# Patient Record
Sex: FEMALE | Race: WHITE | NOT HISPANIC OR LATINO | URBAN - METROPOLITAN AREA
[De-identification: names, ages, dates, MRNs, and addresses within clinical notes are randomized per-mention and may not be internally consistent; named-entity substitution may affect disease eponyms.]

---

## 2017-06-28 ENCOUNTER — INPATIENT (INPATIENT)
Facility: HOSPITAL | Age: 27
LOS: 2 days | Discharge: HOME | End: 2017-07-01
Attending: OBSTETRICS & GYNECOLOGY | Admitting: OBSTETRICS & GYNECOLOGY
Payer: COMMERCIAL

## 2017-06-28 VITALS — WEIGHT: 160.94 LBS | HEIGHT: 61 IN

## 2017-06-28 LAB
BASOPHILS NFR BLD AUTO: 0.4 % — SIGNIFICANT CHANGE UP (ref 0–2)
BLD GP AB SCN SERPL QL: NEGATIVE — SIGNIFICANT CHANGE UP
EOSINOPHIL NFR BLD AUTO: 0.4 % — SIGNIFICANT CHANGE UP (ref 0–6)
HCT VFR BLD CALC: 30.4 % — LOW (ref 34.5–45)
HGB BLD-MCNC: 9.9 G/DL — LOW (ref 11.5–15.5)
LYMPHOCYTES # BLD AUTO: 19.4 % — SIGNIFICANT CHANGE UP (ref 13–44)
MCHC RBC-ENTMCNC: 25.3 PG — LOW (ref 27–34)
MCHC RBC-ENTMCNC: 32.6 G/DL — SIGNIFICANT CHANGE UP (ref 32–36)
MCV RBC AUTO: 77.6 FL — LOW (ref 80–100)
MONOCYTES NFR BLD AUTO: 6.9 % — SIGNIFICANT CHANGE UP (ref 2–14)
NEUTROPHILS NFR BLD AUTO: 72.9 % — SIGNIFICANT CHANGE UP (ref 43–77)
PLATELET # BLD AUTO: 179 K/UL — SIGNIFICANT CHANGE UP (ref 150–400)
RBC # BLD: 3.92 M/UL — SIGNIFICANT CHANGE UP (ref 3.8–5.2)
RBC # FLD: 13.1 % — SIGNIFICANT CHANGE UP (ref 10.3–16.9)
RH IG SCN BLD-IMP: POSITIVE — SIGNIFICANT CHANGE UP
WBC # BLD: 8.5 K/UL — SIGNIFICANT CHANGE UP (ref 3.8–10.5)
WBC # FLD AUTO: 8.5 K/UL — SIGNIFICANT CHANGE UP (ref 3.8–10.5)

## 2017-06-28 RX ORDER — METOCLOPRAMIDE HCL 10 MG
10 TABLET ORAL ONCE
Qty: 0 | Refills: 0 | Status: DISCONTINUED | OUTPATIENT
Start: 2017-06-28 | End: 2017-06-28

## 2017-06-28 RX ORDER — DIPHENHYDRAMINE HCL 50 MG
25 CAPSULE ORAL EVERY 6 HOURS
Qty: 0 | Refills: 0 | Status: DISCONTINUED | OUTPATIENT
Start: 2017-06-29 | End: 2017-07-01

## 2017-06-28 RX ORDER — LANOLIN
1 OINTMENT (GRAM) TOPICAL
Qty: 0 | Refills: 0 | Status: DISCONTINUED | OUTPATIENT
Start: 2017-06-29 | End: 2017-07-01

## 2017-06-28 RX ORDER — CEFAZOLIN SODIUM 1 G
2000 VIAL (EA) INJECTION ONCE
Qty: 0 | Refills: 0 | Status: DISCONTINUED | OUTPATIENT
Start: 2017-06-28 | End: 2017-07-01

## 2017-06-28 RX ORDER — HEPARIN SODIUM 5000 [USP'U]/ML
5000 INJECTION INTRAVENOUS; SUBCUTANEOUS EVERY 12 HOURS
Qty: 0 | Refills: 0 | Status: DISCONTINUED | OUTPATIENT
Start: 2017-06-29 | End: 2017-07-01

## 2017-06-28 RX ORDER — SODIUM CHLORIDE 9 MG/ML
1000 INJECTION, SOLUTION INTRAVENOUS
Qty: 0 | Refills: 0 | Status: DISCONTINUED | OUTPATIENT
Start: 2017-06-28 | End: 2017-06-29

## 2017-06-28 RX ORDER — DOCUSATE SODIUM 100 MG
100 CAPSULE ORAL
Qty: 0 | Refills: 0 | Status: DISCONTINUED | OUTPATIENT
Start: 2017-06-29 | End: 2017-07-01

## 2017-06-28 RX ORDER — IBUPROFEN 200 MG
600 TABLET ORAL EVERY 6 HOURS
Qty: 0 | Refills: 0 | Status: DISCONTINUED | OUTPATIENT
Start: 2017-06-29 | End: 2017-07-01

## 2017-06-28 RX ORDER — TETANUS TOXOID, REDUCED DIPHTHERIA TOXOID AND ACELLULAR PERTUSSIS VACCINE, ADSORBED 5; 2.5; 8; 8; 2.5 [IU]/.5ML; [IU]/.5ML; UG/.5ML; UG/.5ML; UG/.5ML
0.5 SUSPENSION INTRAMUSCULAR ONCE
Qty: 0 | Refills: 0 | Status: DISCONTINUED | OUTPATIENT
Start: 2017-06-29 | End: 2017-07-01

## 2017-06-28 RX ORDER — OXYTOCIN 10 UNIT/ML
41.67 VIAL (ML) INJECTION
Qty: 20 | Refills: 0 | Status: DISCONTINUED | OUTPATIENT
Start: 2017-06-28 | End: 2017-07-01

## 2017-06-28 RX ORDER — CITRIC ACID/SODIUM CITRATE 300-500 MG
30 SOLUTION, ORAL ORAL ONCE
Qty: 0 | Refills: 0 | Status: DISCONTINUED | OUTPATIENT
Start: 2017-06-28 | End: 2017-06-28

## 2017-06-28 RX ORDER — METOCLOPRAMIDE HCL 10 MG
10 TABLET ORAL ONCE
Qty: 0 | Refills: 0 | Status: COMPLETED | OUTPATIENT
Start: 2017-06-28 | End: 2017-06-28

## 2017-06-28 RX ORDER — SODIUM CHLORIDE 9 MG/ML
1000 INJECTION, SOLUTION INTRAVENOUS
Qty: 0 | Refills: 0 | Status: DISCONTINUED | OUTPATIENT
Start: 2017-06-29 | End: 2017-07-01

## 2017-06-28 RX ORDER — NALOXONE HYDROCHLORIDE 4 MG/.1ML
0.1 SPRAY NASAL
Qty: 0 | Refills: 0 | Status: DISCONTINUED | OUTPATIENT
Start: 2017-06-28 | End: 2017-07-01

## 2017-06-28 RX ORDER — SIMETHICONE 80 MG/1
80 TABLET, CHEWABLE ORAL EVERY 4 HOURS
Qty: 0 | Refills: 0 | Status: DISCONTINUED | OUTPATIENT
Start: 2017-06-29 | End: 2017-07-01

## 2017-06-28 RX ORDER — ONDANSETRON 8 MG/1
4 TABLET, FILM COATED ORAL EVERY 6 HOURS
Qty: 0 | Refills: 0 | Status: DISCONTINUED | OUTPATIENT
Start: 2017-06-28 | End: 2017-07-01

## 2017-06-28 RX ORDER — OXYTOCIN 10 UNIT/ML
333.33 VIAL (ML) INJECTION
Qty: 20 | Refills: 0 | Status: DISCONTINUED | OUTPATIENT
Start: 2017-06-28 | End: 2017-07-01

## 2017-06-28 RX ORDER — GLYCERIN ADULT
1 SUPPOSITORY, RECTAL RECTAL AT BEDTIME
Qty: 0 | Refills: 0 | Status: DISCONTINUED | OUTPATIENT
Start: 2017-06-29 | End: 2017-07-01

## 2017-06-28 RX ORDER — SODIUM CHLORIDE 9 MG/ML
1000 INJECTION, SOLUTION INTRAVENOUS ONCE
Qty: 0 | Refills: 0 | Status: COMPLETED | OUTPATIENT
Start: 2017-06-28 | End: 2017-06-28

## 2017-06-28 RX ORDER — MORPHINE SULFATE 50 MG/1
0.2 CAPSULE, EXTENDED RELEASE ORAL ONCE
Qty: 0 | Refills: 0 | Status: DISCONTINUED | OUTPATIENT
Start: 2017-06-28 | End: 2017-07-01

## 2017-06-28 RX ORDER — ACETAMINOPHEN 500 MG
650 TABLET ORAL EVERY 6 HOURS
Qty: 0 | Refills: 0 | Status: DISCONTINUED | OUTPATIENT
Start: 2017-06-29 | End: 2017-07-01

## 2017-06-28 RX ORDER — FERROUS SULFATE 325(65) MG
325 TABLET ORAL DAILY
Qty: 0 | Refills: 0 | Status: DISCONTINUED | OUTPATIENT
Start: 2017-06-29 | End: 2017-07-01

## 2017-06-28 RX ORDER — OXYTOCIN 10 UNIT/ML
41.67 VIAL (ML) INJECTION
Qty: 20 | Refills: 0 | Status: DISCONTINUED | OUTPATIENT
Start: 2017-06-29 | End: 2017-07-01

## 2017-06-28 RX ORDER — SODIUM CHLORIDE 9 MG/ML
1000 INJECTION, SOLUTION INTRAVENOUS
Qty: 0 | Refills: 0 | Status: DISCONTINUED | OUTPATIENT
Start: 2017-06-28 | End: 2017-06-28

## 2017-06-28 RX ADMIN — SODIUM CHLORIDE 125 MILLILITER(S): 9 INJECTION, SOLUTION INTRAVENOUS at 20:11

## 2017-06-28 RX ADMIN — ONDANSETRON 4 MILLIGRAM(S): 8 TABLET, FILM COATED ORAL at 19:20

## 2017-06-28 RX ADMIN — Medication 10 MILLIGRAM(S): at 21:24

## 2017-06-28 RX ADMIN — SODIUM CHLORIDE 125 MILLILITER(S): 9 INJECTION, SOLUTION INTRAVENOUS at 16:06

## 2017-06-28 RX ADMIN — SODIUM CHLORIDE 2000 MILLILITER(S): 9 INJECTION, SOLUTION INTRAVENOUS at 16:06

## 2017-06-28 NOTE — PATIENT PROFILE OB - PRENATAL LABORATORY TESTS, INFANT PROFILE
HBsAG/HIV/VDRL/RPR/blood type/rubella VDRL/RPR/HBsAG/rubella/blood type/group B strep/HIV antibody screen/rubella/HBsAG/VDRL/RPR/group B strep/blood type/HIV

## 2017-06-29 LAB
HCT VFR BLD CALC: 27 % — LOW (ref 34.5–45)
HGB BLD-MCNC: 8.8 G/DL — LOW (ref 11.5–15.5)
MCHC RBC-ENTMCNC: 25.1 PG — LOW (ref 27–34)
MCHC RBC-ENTMCNC: 32.6 G/DL — SIGNIFICANT CHANGE UP (ref 32–36)
MCV RBC AUTO: 77.1 FL — LOW (ref 80–100)
PLATELET # BLD AUTO: 158 K/UL — SIGNIFICANT CHANGE UP (ref 150–400)
RBC # BLD: 3.5 M/UL — LOW (ref 3.8–5.2)
RBC # FLD: 13.2 % — SIGNIFICANT CHANGE UP (ref 10.3–16.9)
T PALLIDUM AB TITR SER: NEGATIVE — SIGNIFICANT CHANGE UP
WBC # BLD: 7.3 K/UL — SIGNIFICANT CHANGE UP (ref 3.8–10.5)
WBC # FLD AUTO: 7.3 K/UL — SIGNIFICANT CHANGE UP (ref 3.8–10.5)

## 2017-06-29 RX ADMIN — Medication 600 MILLIGRAM(S): at 06:04

## 2017-06-29 RX ADMIN — Medication 600 MILLIGRAM(S): at 05:10

## 2017-06-29 RX ADMIN — HEPARIN SODIUM 5000 UNIT(S): 5000 INJECTION INTRAVENOUS; SUBCUTANEOUS at 06:20

## 2017-06-29 RX ADMIN — Medication 100 MILLIGRAM(S): at 05:07

## 2017-06-29 RX ADMIN — Medication 600 MILLIGRAM(S): at 14:49

## 2017-06-29 RX ADMIN — Medication 600 MILLIGRAM(S): at 14:05

## 2017-06-29 RX ADMIN — Medication 325 MILLIGRAM(S): at 11:26

## 2017-06-29 RX ADMIN — HEPARIN SODIUM 5000 UNIT(S): 5000 INJECTION INTRAVENOUS; SUBCUTANEOUS at 18:14

## 2017-06-29 RX ADMIN — Medication 600 MILLIGRAM(S): at 21:42

## 2017-06-29 RX ADMIN — Medication 600 MILLIGRAM(S): at 22:15

## 2017-06-29 RX ADMIN — Medication 100 MILLIGRAM(S): at 11:26

## 2017-06-30 RX ADMIN — Medication 600 MILLIGRAM(S): at 12:44

## 2017-06-30 RX ADMIN — Medication 600 MILLIGRAM(S): at 06:48

## 2017-06-30 RX ADMIN — Medication 600 MILLIGRAM(S): at 18:28

## 2017-06-30 RX ADMIN — Medication 600 MILLIGRAM(S): at 07:20

## 2017-06-30 RX ADMIN — Medication 600 MILLIGRAM(S): at 13:11

## 2017-06-30 RX ADMIN — Medication 1 TABLET(S): at 09:57

## 2017-06-30 RX ADMIN — HEPARIN SODIUM 5000 UNIT(S): 5000 INJECTION INTRAVENOUS; SUBCUTANEOUS at 18:28

## 2017-06-30 RX ADMIN — HEPARIN SODIUM 5000 UNIT(S): 5000 INJECTION INTRAVENOUS; SUBCUTANEOUS at 06:48

## 2017-06-30 RX ADMIN — SIMETHICONE 80 MILLIGRAM(S): 80 TABLET, CHEWABLE ORAL at 06:57

## 2017-06-30 RX ADMIN — Medication 325 MILLIGRAM(S): at 09:57

## 2017-06-30 NOTE — DISCHARGE NOTE OB - CARE PROVIDER_API CALL
Hansel Mae (MD), Obstetrics and Gynecology  [address] [place] [address], [place] [id number]  Phone: [phone]  Fax: [phone]

## 2017-06-30 NOTE — DISCHARGE NOTE OB - CARE PLAN
Principal Discharge DX:	Postpartum state  Goal:	Postpartum  Instructions for follow-up, activity and diet:	Follow up in 1-2 weeks.  No heavy lifting, pelvic rest until cleared.

## 2017-06-30 NOTE — DISCHARGE NOTE OB - PATIENT PORTAL LINK FT
“You can access the FollowHealth Patient Portal, offered by [facility], by registering with the following website: [email or web address]”

## 2017-07-01 VITALS
TEMPERATURE: 98 F | SYSTOLIC BLOOD PRESSURE: 107 MMHG | RESPIRATION RATE: 15 BRPM | OXYGEN SATURATION: 100 % | DIASTOLIC BLOOD PRESSURE: 69 MMHG | HEART RATE: 84 BPM

## 2017-07-01 PROCEDURE — 36415 COLL VENOUS BLD VENIPUNCTURE: CPT

## 2017-07-01 PROCEDURE — 86901 BLOOD TYPING SEROLOGIC RH(D): CPT

## 2017-07-01 PROCEDURE — C1765: CPT

## 2017-07-01 PROCEDURE — 85027 COMPLETE CBC AUTOMATED: CPT

## 2017-07-01 PROCEDURE — 85025 COMPLETE CBC W/AUTO DIFF WBC: CPT

## 2017-07-01 PROCEDURE — 86850 RBC ANTIBODY SCREEN: CPT

## 2017-07-01 PROCEDURE — 86780 TREPONEMA PALLIDUM: CPT

## 2017-07-01 PROCEDURE — 86900 BLOOD TYPING SEROLOGIC ABO: CPT

## 2017-07-01 RX ADMIN — Medication 600 MILLIGRAM(S): at 07:15

## 2017-07-01 RX ADMIN — Medication 600 MILLIGRAM(S): at 12:27

## 2017-07-01 RX ADMIN — HEPARIN SODIUM 5000 UNIT(S): 5000 INJECTION INTRAVENOUS; SUBCUTANEOUS at 06:39

## 2017-07-01 RX ADMIN — Medication 600 MILLIGRAM(S): at 06:37

## 2017-07-01 RX ADMIN — Medication 325 MILLIGRAM(S): at 11:48

## 2017-07-01 RX ADMIN — Medication 100 MILLIGRAM(S): at 11:48

## 2017-07-01 RX ADMIN — Medication 1 TABLET(S): at 11:48

## 2017-07-01 RX ADMIN — Medication 600 MILLIGRAM(S): at 12:59

## 2017-07-01 NOTE — PROGRESS NOTE ADULT - SUBJECTIVE AND OBJECTIVE BOX
Patient evaluated at bedside.   She reports pain is well controlled.  She denies heavy vaginal bleeding.  She has been ambulating without assistance, voiding spontaneously, passing gas, tolerating regular diet.    Physical Exam:  Vital Signs Last 24 Hrs  T(C): 36.4 (01 Jul 2017 10:07), Max: 36.7 (30 Jun 2017 18:39)  T(F): 97.6 (01 Jul 2017 10:07), Max: 98 (30 Jun 2017 18:39)  HR: 84 (01 Jul 2017 10:07) (81 - 93)  BP: 107/69 (01 Jul 2017 10:07) (100/69 - 110/69)  BP(mean): --  RR: 15 (01 Jul 2017 10:07) (15 - 17)  SpO2: 100% (01 Jul 2017 10:07) (99% - 100%)    GA: NAD, A+0 x 3  Abd: soft, nontender, nondistended, no rebound or guarding, incision clean, dry and intact, uterus firm at midline,  below the umbilicus  : lochia WNL  Extremities: no calf tenderness
Patient evaluated at bedside.   She reports pain is well controlled.  She denies heavy vaginal bleeding.  She has not yet ambulating,not yet passing gas, toleratingclears and is breastfeeding.    Physical Exam:  Vital Signs Last 24 Hrs  T(C): 36.6 (29 Jun 2017 05:05), Max: 36.6 (29 Jun 2017 05:05)  T(F): 97.8 (29 Jun 2017 05:05), Max: 97.8 (29 Jun 2017 05:05)  HR: 87 (29 Jun 2017 05:05) (81 - 108)  BP: 106/70 (29 Jun 2017 05:05) (87/62 - 130/80)  BP(mean): --  RR: 18 (29 Jun 2017 05:05) (16 - 18)  SpO2: 98% (29 Jun 2017 05:05) (98% - 100%)    GA: NAD, A+0 x 3  Abd: soft, nontender, nondistended, no rebound or guarding, incision clean, dry and intact, uterus firm at midline,  below the umbilicus  : lochia WNL  Extremities: no calf tenderness                            8.8    7.3   )-----------( 158      ( 29 Jun 2017 05:17 )             27.0
Patient evaluated at bedside.   She reports pain is well controlled.  She denies heavy vaginal bleeding.  She has been ambulating without assistance, voiding spontaneously, passing gas, tolerating regular diet.    Physical Exam:  Vital Signs Last 24 Hrs  T(C): 36.8 (30 Jun 2017 06:00), Max: 36.8 (30 Jun 2017 06:00)  T(F): 98.2 (30 Jun 2017 06:00), Max: 98.2 (30 Jun 2017 06:00)  HR: 96 (30 Jun 2017 06:00) (91 - 96)  BP: 98/62 (30 Jun 2017 06:00) (98/62 - 99/67)  BP(mean): --  RR: 18 (30 Jun 2017 06:00) (18 - 18)  SpO2: 98% (30 Jun 2017 06:00) (97% - 98%)    GA: NAD, A+0 x 3  Abd: soft, nontender, nondistended, no rebound or guarding, incision clean, dry and intact, uterus firm at midline,  below the umbilicus  : lochia WNL  Extremities: no calf tenderness                            8.8    7.3   )-----------( 158      ( 29 Jun 2017 05:17 )             27.0

## 2017-07-01 NOTE — PROGRESS NOTE ADULT - ASSESSMENT
A/P  28yo  s/p c/s, POD #2  ,stable  1. Pain: well controlled on PO pain meds  2. GI: regular diet  3. : voiding  4. DVT prophylaxis: sqh, ambulation  5. Dispo: POD 3 or 4
A/P  26yo  s/p c/s, POD #1  ,stable  1. Pain: well controlled on PO pain meds  2. GI: advance diet as tolerated  3. : masters out this AM for TOV  4. DVT prophylaxis: SQH, ambualtion  5. Dispo: POD 3 or 4
A/P  28yo  s/p c/s, POD #1  ,stable  1. Pain: well controlled on PO pain meds  2. GI: regular diet  3. : voiding  4. DVT prophylaxis: SQH, ambulation  5. Dispo: POD 3

## 2017-07-05 DIAGNOSIS — Z3A.39 39 WEEKS GESTATION OF PREGNANCY: ICD-10-CM

## 2017-07-05 DIAGNOSIS — O34.211 MATERNAL CARE FOR LOW TRANSVERSE SCAR FROM PREVIOUS CESAREAN DELIVERY: ICD-10-CM

## 2020-08-07 PROBLEM — Z00.00 ENCOUNTER FOR PREVENTIVE HEALTH EXAMINATION: Status: ACTIVE | Noted: 2020-08-07

## 2020-08-31 ENCOUNTER — APPOINTMENT (OUTPATIENT)
Dept: ANTEPARTUM | Facility: CLINIC | Age: 30
End: 2020-08-31
Payer: MEDICAID

## 2020-08-31 ENCOUNTER — LABORATORY RESULT (OUTPATIENT)
Age: 30
End: 2020-08-31

## 2020-08-31 PROCEDURE — 76813 OB US NUCHAL MEAS 1 GEST: CPT

## 2020-10-26 ENCOUNTER — APPOINTMENT (OUTPATIENT)
Dept: ANTEPARTUM | Facility: CLINIC | Age: 30
End: 2020-10-26
Payer: MEDICAID

## 2020-10-26 PROCEDURE — 76817 TRANSVAGINAL US OBSTETRIC: CPT

## 2020-10-26 PROCEDURE — 99072 ADDL SUPL MATRL&STAF TM PHE: CPT

## 2020-10-26 PROCEDURE — 76805 OB US >/= 14 WKS SNGL FETUS: CPT

## 2020-12-01 ENCOUNTER — APPOINTMENT (OUTPATIENT)
Dept: ANTEPARTUM | Facility: CLINIC | Age: 30
End: 2020-12-01
Payer: MEDICAID

## 2020-12-01 PROCEDURE — 76817 TRANSVAGINAL US OBSTETRIC: CPT

## 2020-12-01 PROCEDURE — 76816 OB US FOLLOW-UP PER FETUS: CPT

## 2020-12-01 PROCEDURE — 76819 FETAL BIOPHYS PROFIL W/O NST: CPT

## 2020-12-01 PROCEDURE — 99072 ADDL SUPL MATRL&STAF TM PHE: CPT

## 2020-12-22 ENCOUNTER — APPOINTMENT (OUTPATIENT)
Dept: ANTEPARTUM | Facility: CLINIC | Age: 30
End: 2020-12-22

## 2021-02-25 ENCOUNTER — EMERGENCY (EMERGENCY)
Facility: HOSPITAL | Age: 31
LOS: 1 days | Discharge: LEFT BEFORE DISCHARGE | End: 2021-02-25
Attending: EMERGENCY MEDICINE | Admitting: EMERGENCY MEDICINE
Payer: MEDICAID

## 2021-02-25 VITALS
OXYGEN SATURATION: 100 % | RESPIRATION RATE: 18 BRPM | DIASTOLIC BLOOD PRESSURE: 78 MMHG | SYSTOLIC BLOOD PRESSURE: 115 MMHG | HEART RATE: 98 BPM | TEMPERATURE: 97 F

## 2021-02-25 VITALS
SYSTOLIC BLOOD PRESSURE: 120 MMHG | RESPIRATION RATE: 18 BRPM | OXYGEN SATURATION: 100 % | WEIGHT: 154.98 LBS | TEMPERATURE: 98 F | HEIGHT: 61 IN | HEART RATE: 106 BPM | DIASTOLIC BLOOD PRESSURE: 71 MMHG

## 2021-02-25 DIAGNOSIS — R06.02 SHORTNESS OF BREATH: ICD-10-CM

## 2021-02-25 DIAGNOSIS — O26.893 OTHER SPECIFIED PREGNANCY RELATED CONDITIONS, THIRD TRIMESTER: ICD-10-CM

## 2021-02-25 DIAGNOSIS — Z3A.38 38 WEEKS GESTATION OF PREGNANCY: ICD-10-CM

## 2021-02-25 DIAGNOSIS — R00.2 PALPITATIONS: ICD-10-CM

## 2021-02-25 LAB
ALBUMIN SERPL ELPH-MCNC: 3.4 G/DL — SIGNIFICANT CHANGE UP (ref 3.3–5)
ALP SERPL-CCNC: 139 U/L — HIGH (ref 40–120)
ALT FLD-CCNC: 9 U/L — LOW (ref 10–45)
ANION GAP SERPL CALC-SCNC: 13 MMOL/L — SIGNIFICANT CHANGE UP (ref 5–17)
AST SERPL-CCNC: 17 U/L — SIGNIFICANT CHANGE UP (ref 10–40)
BASOPHILS # BLD AUTO: 0.02 K/UL — SIGNIFICANT CHANGE UP (ref 0–0.2)
BASOPHILS NFR BLD AUTO: 0.3 % — SIGNIFICANT CHANGE UP (ref 0–2)
BILIRUB SERPL-MCNC: 0.2 MG/DL — SIGNIFICANT CHANGE UP (ref 0.2–1.2)
BUN SERPL-MCNC: 5 MG/DL — LOW (ref 7–23)
CALCIUM SERPL-MCNC: 9.4 MG/DL — SIGNIFICANT CHANGE UP (ref 8.4–10.5)
CHLORIDE SERPL-SCNC: 105 MMOL/L — SIGNIFICANT CHANGE UP (ref 96–108)
CO2 SERPL-SCNC: 20 MMOL/L — LOW (ref 22–31)
CREAT SERPL-MCNC: 0.51 MG/DL — SIGNIFICANT CHANGE UP (ref 0.5–1.3)
EOSINOPHIL # BLD AUTO: 0.03 K/UL — SIGNIFICANT CHANGE UP (ref 0–0.5)
EOSINOPHIL NFR BLD AUTO: 0.4 % — SIGNIFICANT CHANGE UP (ref 0–6)
GLUCOSE SERPL-MCNC: 72 MG/DL — SIGNIFICANT CHANGE UP (ref 70–99)
HCT VFR BLD CALC: 32.5 % — LOW (ref 34.5–45)
HGB BLD-MCNC: 10.2 G/DL — LOW (ref 11.5–15.5)
IMM GRANULOCYTES NFR BLD AUTO: 0.6 % — SIGNIFICANT CHANGE UP (ref 0–1.5)
LYMPHOCYTES # BLD AUTO: 1.46 K/UL — SIGNIFICANT CHANGE UP (ref 1–3.3)
LYMPHOCYTES # BLD AUTO: 18.8 % — SIGNIFICANT CHANGE UP (ref 13–44)
MCHC RBC-ENTMCNC: 25.1 PG — LOW (ref 27–34)
MCHC RBC-ENTMCNC: 31.4 GM/DL — LOW (ref 32–36)
MCV RBC AUTO: 79.9 FL — LOW (ref 80–100)
MONOCYTES # BLD AUTO: 0.63 K/UL — SIGNIFICANT CHANGE UP (ref 0–0.9)
MONOCYTES NFR BLD AUTO: 8.1 % — SIGNIFICANT CHANGE UP (ref 2–14)
NEUTROPHILS # BLD AUTO: 5.57 K/UL — SIGNIFICANT CHANGE UP (ref 1.8–7.4)
NEUTROPHILS NFR BLD AUTO: 71.8 % — SIGNIFICANT CHANGE UP (ref 43–77)
NRBC # BLD: 0 /100 WBCS — SIGNIFICANT CHANGE UP (ref 0–0)
PLATELET # BLD AUTO: 212 K/UL — SIGNIFICANT CHANGE UP (ref 150–400)
POTASSIUM SERPL-MCNC: 4.1 MMOL/L — SIGNIFICANT CHANGE UP (ref 3.5–5.3)
POTASSIUM SERPL-SCNC: 4.1 MMOL/L — SIGNIFICANT CHANGE UP (ref 3.5–5.3)
PROT SERPL-MCNC: 6.8 G/DL — SIGNIFICANT CHANGE UP (ref 6–8.3)
RBC # BLD: 4.07 M/UL — SIGNIFICANT CHANGE UP (ref 3.8–5.2)
RBC # FLD: 13.7 % — SIGNIFICANT CHANGE UP (ref 10.3–14.5)
SODIUM SERPL-SCNC: 138 MMOL/L — SIGNIFICANT CHANGE UP (ref 135–145)
WBC # BLD: 7.76 K/UL — SIGNIFICANT CHANGE UP (ref 3.8–10.5)
WBC # FLD AUTO: 7.76 K/UL — SIGNIFICANT CHANGE UP (ref 3.8–10.5)

## 2021-02-25 PROCEDURE — 84484 ASSAY OF TROPONIN QUANT: CPT

## 2021-02-25 PROCEDURE — 93005 ELECTROCARDIOGRAM TRACING: CPT

## 2021-02-25 PROCEDURE — 80053 COMPREHEN METABOLIC PANEL: CPT

## 2021-02-25 PROCEDURE — 82550 ASSAY OF CK (CPK): CPT

## 2021-02-25 PROCEDURE — 99285 EMERGENCY DEPT VISIT HI MDM: CPT

## 2021-02-25 PROCEDURE — 82553 CREATINE MB FRACTION: CPT

## 2021-02-25 PROCEDURE — 93010 ELECTROCARDIOGRAM REPORT: CPT

## 2021-02-25 PROCEDURE — 36415 COLL VENOUS BLD VENIPUNCTURE: CPT

## 2021-02-25 PROCEDURE — 93970 EXTREMITY STUDY: CPT | Mod: 26

## 2021-02-25 PROCEDURE — 71045 X-RAY EXAM CHEST 1 VIEW: CPT

## 2021-02-25 PROCEDURE — 93970 EXTREMITY STUDY: CPT

## 2021-02-25 PROCEDURE — 83880 ASSAY OF NATRIURETIC PEPTIDE: CPT

## 2021-02-25 PROCEDURE — 85025 COMPLETE CBC W/AUTO DIFF WBC: CPT

## 2021-02-25 PROCEDURE — 71045 X-RAY EXAM CHEST 1 VIEW: CPT | Mod: 26

## 2021-02-25 NOTE — ED PROVIDER NOTE - CLINICAL SUMMARY MEDICAL DECISION MAKING FREE TEXT BOX
31 y/o F pt presents to ED with shortness of breath and palpitations. Will check labs, EKG, CXR, LE dopplers, case discussed with OB on call, will be in ED to evaluate pt.

## 2021-02-25 NOTE — ED ADULT NURSE REASSESSMENT NOTE - NS ED NURSE REASSESS COMMENT FT1
Dr. Tay spoke to patient, patient signed AMA, states does not want to stay.  Patient left in stable condition, vital signs stable.

## 2021-02-25 NOTE — ED ADULT NURSE NOTE - CHIEF COMPLAINT QUOTE
31 y/o female in her 38th week of pregnancy came in c/o cough/SOB/palpitations for "months" worse today. Pt denies fever, chills.

## 2021-02-25 NOTE — ED ADULT NURSE REASSESSMENT NOTE - NS ED NURSE REASSESS COMMENT FT1
Patient a/oX 3, non-anxious, states no SOB at this time, no pain or any other symptoms.  Vital signs stable.  US pending.  STable and comfortable.  All labs sent.

## 2021-02-25 NOTE — ED ADULT NURSE REASSESSMENT NOTE - NS ED NURSE REASSESS COMMENT FT1
Pateint back from US, denies SOB or any other symptom complaint, vital signs stable.  Patient for L & D admit;  refuses admission, patient and  states wants to leave, does not want to stay.  OSWALD Ny aware and spoke w/ patient and ;  L & D called to speak with patient.  Will continue to monitor.

## 2021-02-25 NOTE — ED ADULT TRIAGE NOTE - CHIEF COMPLAINT QUOTE
29 y/o female in her 38th week of pregnancy came in c/o cough/SOB/palpitations for "months" worse today. Pt denies fever, chills.

## 2021-02-25 NOTE — ED ADULT NURSE NOTE - NSIMPLEMENTINTERV_GEN_ALL_ED
Implemented All Universal Safety Interventions:  [place] to call system. Call bell, personal items and telephone within reach. Instruct patient to call for assistance. Room bathroom lighting operational. Non-slip footwear when patient is off stretcher. Physically safe environment: no spills, clutter or unnecessary equipment. Stretcher in lowest position, wheels locked, appropriate side rails in place.

## 2021-02-25 NOTE — ED PROVIDER NOTE - OBJECTIVE STATEMENT
31 y/o F pt who is 38weeks pregnant with no pertinent PMHx and PSHx presents to ED c/o persistent sob and palpitations with associated chronic cough for months, however it has worsened today. Pt denies abdominal cramping, vaginal discharge, back pain, or any other acute complaints. Pt is well appearing in ED and speaking in full sentences.

## 2021-02-25 NOTE — ED ADULT NURSE NOTE - OBJECTIVE STATEMENT
29 y/o female received into the ED, axox3, stating that she is feeling SOB x2 days, palpitations and a "worsening cough."   Pt. is 30 weeks pregnant with 3rd child.  Pt. denies having any pregnancy complications to date.  Pt. denies having any chest pain at this time.

## 2021-03-05 PROBLEM — Z78.9 OTHER SPECIFIED HEALTH STATUS: Chronic | Status: ACTIVE | Noted: 2021-02-25

## 2021-03-08 ENCOUNTER — TRANSCRIPTION ENCOUNTER (OUTPATIENT)
Age: 31
End: 2021-03-08

## 2021-03-09 ENCOUNTER — RESULT REVIEW (OUTPATIENT)
Age: 31
End: 2021-03-09

## 2021-03-09 ENCOUNTER — INPATIENT (INPATIENT)
Facility: HOSPITAL | Age: 31
LOS: 1 days | Discharge: HOME | End: 2021-03-11
Attending: SPECIALIST | Admitting: SPECIALIST
Payer: MEDICAID

## 2021-03-09 VITALS — WEIGHT: 154.32 LBS | HEIGHT: 61 IN

## 2021-03-09 LAB
BLD GP AB SCN SERPL QL: NEGATIVE — SIGNIFICANT CHANGE UP
BLD GP AB SCN SERPL QL: NEGATIVE — SIGNIFICANT CHANGE UP
RH IG SCN BLD-IMP: POSITIVE — SIGNIFICANT CHANGE UP
RH IG SCN BLD-IMP: POSITIVE — SIGNIFICANT CHANGE UP

## 2021-03-09 PROCEDURE — 88307 TISSUE EXAM BY PATHOLOGIST: CPT | Mod: 26

## 2021-03-09 RX ORDER — DIPHENHYDRAMINE HCL 50 MG
25 CAPSULE ORAL EVERY 6 HOURS
Refills: 0 | Status: DISCONTINUED | OUTPATIENT
Start: 2021-03-09 | End: 2021-03-11

## 2021-03-09 RX ORDER — CHLORHEXIDINE GLUCONATE 213 G/1000ML
1 SOLUTION TOPICAL DAILY
Refills: 0 | Status: DISCONTINUED | OUTPATIENT
Start: 2021-03-09 | End: 2021-03-11

## 2021-03-09 RX ORDER — FOLIC ACID 0.8 MG
1 TABLET ORAL DAILY
Refills: 0 | Status: DISCONTINUED | OUTPATIENT
Start: 2021-03-09 | End: 2021-03-11

## 2021-03-09 RX ORDER — KETOROLAC TROMETHAMINE 30 MG/ML
30 SYRINGE (ML) INJECTION ONCE
Refills: 0 | Status: DISCONTINUED | OUTPATIENT
Start: 2021-03-09 | End: 2021-03-11

## 2021-03-09 RX ORDER — CITRIC ACID/SODIUM CITRATE 300-500 MG
15 SOLUTION, ORAL ORAL EVERY 6 HOURS
Refills: 0 | Status: DISCONTINUED | OUTPATIENT
Start: 2021-03-09 | End: 2021-03-11

## 2021-03-09 RX ORDER — OXYTOCIN 10 UNIT/ML
333.33 VIAL (ML) INJECTION
Qty: 20 | Refills: 0 | Status: DISCONTINUED | OUTPATIENT
Start: 2021-03-09 | End: 2021-03-09

## 2021-03-09 RX ORDER — IBUPROFEN 200 MG
600 TABLET ORAL EVERY 6 HOURS
Refills: 0 | Status: COMPLETED | OUTPATIENT
Start: 2021-03-09 | End: 2022-02-05

## 2021-03-09 RX ORDER — FERROUS SULFATE 325(65) MG
325 TABLET ORAL DAILY
Refills: 0 | Status: DISCONTINUED | OUTPATIENT
Start: 2021-03-09 | End: 2021-03-11

## 2021-03-09 RX ORDER — CEFAZOLIN SODIUM 1 G
2000 VIAL (EA) INJECTION ONCE
Refills: 0 | Status: COMPLETED | OUTPATIENT
Start: 2021-03-09 | End: 2021-03-09

## 2021-03-09 RX ORDER — CITRIC ACID/SODIUM CITRATE 300-500 MG
30 SOLUTION, ORAL ORAL ONCE
Refills: 0 | Status: COMPLETED | OUTPATIENT
Start: 2021-03-09 | End: 2021-03-09

## 2021-03-09 RX ORDER — SODIUM CHLORIDE 9 MG/ML
1000 INJECTION, SOLUTION INTRAVENOUS
Refills: 0 | Status: DISCONTINUED | OUTPATIENT
Start: 2021-03-09 | End: 2021-03-11

## 2021-03-09 RX ORDER — LANOLIN
1 OINTMENT (GRAM) TOPICAL EVERY 6 HOURS
Refills: 0 | Status: DISCONTINUED | OUTPATIENT
Start: 2021-03-09 | End: 2021-03-11

## 2021-03-09 RX ORDER — MAGNESIUM HYDROXIDE 400 MG/1
30 TABLET, CHEWABLE ORAL
Refills: 0 | Status: DISCONTINUED | OUTPATIENT
Start: 2021-03-09 | End: 2021-03-11

## 2021-03-09 RX ORDER — TETANUS TOXOID, REDUCED DIPHTHERIA TOXOID AND ACELLULAR PERTUSSIS VACCINE, ADSORBED 5; 2.5; 8; 8; 2.5 [IU]/.5ML; [IU]/.5ML; UG/.5ML; UG/.5ML; UG/.5ML
0.5 SUSPENSION INTRAMUSCULAR ONCE
Refills: 0 | Status: DISCONTINUED | OUTPATIENT
Start: 2021-03-09 | End: 2021-03-11

## 2021-03-09 RX ORDER — OXYCODONE HYDROCHLORIDE 5 MG/1
5 TABLET ORAL ONCE
Refills: 0 | Status: DISCONTINUED | OUTPATIENT
Start: 2021-03-09 | End: 2021-03-11

## 2021-03-09 RX ORDER — OXYTOCIN 10 UNIT/ML
333.33 VIAL (ML) INJECTION
Qty: 20 | Refills: 0 | Status: DISCONTINUED | OUTPATIENT
Start: 2021-03-09 | End: 2021-03-11

## 2021-03-09 RX ORDER — OXYCODONE HYDROCHLORIDE 5 MG/1
5 TABLET ORAL
Refills: 0 | Status: COMPLETED | OUTPATIENT
Start: 2021-03-09 | End: 2021-03-16

## 2021-03-09 RX ORDER — METOCLOPRAMIDE HCL 10 MG
5 TABLET ORAL ONCE
Refills: 0 | Status: COMPLETED | OUTPATIENT
Start: 2021-03-09 | End: 2021-03-09

## 2021-03-09 RX ORDER — ENOXAPARIN SODIUM 100 MG/ML
40 INJECTION SUBCUTANEOUS DAILY
Refills: 0 | Status: DISCONTINUED | OUTPATIENT
Start: 2021-03-10 | End: 2021-03-11

## 2021-03-09 RX ORDER — SIMETHICONE 80 MG/1
80 TABLET, CHEWABLE ORAL EVERY 4 HOURS
Refills: 0 | Status: DISCONTINUED | OUTPATIENT
Start: 2021-03-09 | End: 2021-03-11

## 2021-03-09 RX ORDER — ACETAMINOPHEN 500 MG
975 TABLET ORAL
Refills: 0 | Status: DISCONTINUED | OUTPATIENT
Start: 2021-03-09 | End: 2021-03-11

## 2021-03-09 RX ADMIN — SODIUM CHLORIDE 125 MILLILITER(S): 9 INJECTION, SOLUTION INTRAVENOUS at 13:01

## 2021-03-09 RX ADMIN — Medication 30 MILLILITER(S): at 13:02

## 2021-03-09 RX ADMIN — Medication 1000 MILLIUNIT(S)/MIN: at 14:56

## 2021-03-09 RX ADMIN — CHLORHEXIDINE GLUCONATE 1 APPLICATION(S): 213 SOLUTION TOPICAL at 13:02

## 2021-03-09 RX ADMIN — Medication 5 MILLIGRAM(S): at 16:00

## 2021-03-09 RX ADMIN — Medication 975 MILLIGRAM(S): at 20:53

## 2021-03-09 RX ADMIN — Medication 100 MILLIGRAM(S): at 13:02

## 2021-03-09 RX ADMIN — Medication 975 MILLIGRAM(S): at 21:45

## 2021-03-10 LAB
BASOPHILS # BLD AUTO: 0.04 K/UL — SIGNIFICANT CHANGE UP (ref 0–0.2)
BASOPHILS NFR BLD AUTO: 0.4 % — SIGNIFICANT CHANGE UP (ref 0–2)
EOSINOPHIL # BLD AUTO: 0.02 K/UL — SIGNIFICANT CHANGE UP (ref 0–0.5)
EOSINOPHIL NFR BLD AUTO: 0.2 % — SIGNIFICANT CHANGE UP (ref 0–6)
HCT VFR BLD CALC: 26.2 % — LOW (ref 34.5–45)
HGB BLD-MCNC: 8 G/DL — LOW (ref 11.5–15.5)
IMM GRANULOCYTES NFR BLD AUTO: 0.5 % — SIGNIFICANT CHANGE UP (ref 0–1.5)
LYMPHOCYTES # BLD AUTO: 1.73 K/UL — SIGNIFICANT CHANGE UP (ref 1–3.3)
LYMPHOCYTES # BLD AUTO: 18.8 % — SIGNIFICANT CHANGE UP (ref 13–44)
MCHC RBC-ENTMCNC: 24.3 PG — LOW (ref 27–34)
MCHC RBC-ENTMCNC: 30.5 GM/DL — LOW (ref 32–36)
MCV RBC AUTO: 79.6 FL — LOW (ref 80–100)
MONOCYTES # BLD AUTO: 0.76 K/UL — SIGNIFICANT CHANGE UP (ref 0–0.9)
MONOCYTES NFR BLD AUTO: 8.2 % — SIGNIFICANT CHANGE UP (ref 2–14)
NEUTROPHILS # BLD AUTO: 6.62 K/UL — SIGNIFICANT CHANGE UP (ref 1.8–7.4)
NEUTROPHILS NFR BLD AUTO: 71.9 % — SIGNIFICANT CHANGE UP (ref 43–77)
NRBC # BLD: 0 /100 WBCS — SIGNIFICANT CHANGE UP (ref 0–0)
PLATELET # BLD AUTO: 172 K/UL — SIGNIFICANT CHANGE UP (ref 150–400)
RBC # BLD: 3.29 M/UL — LOW (ref 3.8–5.2)
RBC # FLD: 13.9 % — SIGNIFICANT CHANGE UP (ref 10.3–14.5)
WBC # BLD: 9.22 K/UL — SIGNIFICANT CHANGE UP (ref 3.8–10.5)
WBC # FLD AUTO: 9.22 K/UL — SIGNIFICANT CHANGE UP (ref 3.8–10.5)

## 2021-03-10 RX ORDER — OXYCODONE HYDROCHLORIDE 5 MG/1
5 TABLET ORAL
Refills: 0 | Status: DISCONTINUED | OUTPATIENT
Start: 2021-03-10 | End: 2021-03-11

## 2021-03-10 RX ORDER — KETOROLAC TROMETHAMINE 30 MG/ML
30 SYRINGE (ML) INJECTION EVERY 8 HOURS
Refills: 0 | Status: DISCONTINUED | OUTPATIENT
Start: 2021-03-10 | End: 2021-03-10

## 2021-03-10 RX ORDER — IBUPROFEN 200 MG
600 TABLET ORAL EVERY 6 HOURS
Refills: 0 | Status: DISCONTINUED | OUTPATIENT
Start: 2021-03-10 | End: 2021-03-11

## 2021-03-10 RX ADMIN — Medication 975 MILLIGRAM(S): at 21:13

## 2021-03-10 RX ADMIN — Medication 975 MILLIGRAM(S): at 16:00

## 2021-03-10 RX ADMIN — OXYCODONE HYDROCHLORIDE 5 MILLIGRAM(S): 5 TABLET ORAL at 23:31

## 2021-03-10 RX ADMIN — Medication 30 MILLIGRAM(S): at 17:49

## 2021-03-10 RX ADMIN — ENOXAPARIN SODIUM 40 MILLIGRAM(S): 100 INJECTION SUBCUTANEOUS at 12:06

## 2021-03-10 RX ADMIN — Medication 975 MILLIGRAM(S): at 22:05

## 2021-03-10 RX ADMIN — Medication 975 MILLIGRAM(S): at 04:25

## 2021-03-10 RX ADMIN — Medication 975 MILLIGRAM(S): at 15:00

## 2021-03-10 RX ADMIN — Medication 30 MILLIGRAM(S): at 12:06

## 2021-03-10 RX ADMIN — Medication 30 MILLIGRAM(S): at 05:51

## 2021-03-10 RX ADMIN — Medication 975 MILLIGRAM(S): at 09:30

## 2021-03-10 RX ADMIN — OXYCODONE HYDROCHLORIDE 5 MILLIGRAM(S): 5 TABLET ORAL at 19:05

## 2021-03-10 RX ADMIN — Medication 30 MILLIGRAM(S): at 00:24

## 2021-03-10 RX ADMIN — OXYCODONE HYDROCHLORIDE 5 MILLIGRAM(S): 5 TABLET ORAL at 11:30

## 2021-03-10 RX ADMIN — Medication 325 MILLIGRAM(S): at 12:06

## 2021-03-10 RX ADMIN — Medication 975 MILLIGRAM(S): at 08:35

## 2021-03-10 RX ADMIN — OXYCODONE HYDROCHLORIDE 5 MILLIGRAM(S): 5 TABLET ORAL at 10:39

## 2021-03-10 RX ADMIN — Medication 30 MILLIGRAM(S): at 13:00

## 2021-03-10 RX ADMIN — Medication 600 MILLIGRAM(S): at 23:32

## 2021-03-10 RX ADMIN — Medication 1 MILLIGRAM(S): at 12:06

## 2021-03-10 RX ADMIN — Medication 30 MILLIGRAM(S): at 18:45

## 2021-03-10 RX ADMIN — Medication 30 MILLIGRAM(S): at 01:50

## 2021-03-10 RX ADMIN — Medication 975 MILLIGRAM(S): at 03:13

## 2021-03-10 RX ADMIN — Medication 1 TABLET(S): at 12:06

## 2021-03-10 NOTE — LACTATION INITIAL EVALUATION - NS LACT CON REASON FOR REQ
Spoke with RN who stated that infant will be receiving bottles in the nursery tonight, at pt's request due to pt not feeling well. LC to be called when/if pt would like a consult. Pt is now P3./multiparous mom
39 wk gestation baby girl, seen around 20hrs of life, presents with 2% weight loss, 2 wet and 3 dirty diapers since birth. Baby has been mainly formula fed since birth as mother wishes to both breast and bottle feed, sends baby to nursery overnight. This is her third child. She pumped for her now 3 and 5 year old children for about 3 months each. Baby in nursery at time of visit but mother was encouraged to call for my assistance as needed. Mother reports baby latched and fed well around 10am. Complete breastfeeding education was reviewed and she verbalized understanding of info given. Implications of early intro to artificial nipples and mixed feeding was discussed, as well as the supply and demand feedback system for milk production. Intake guidelines, size of  stomach, and paced feeding technique also discussed. SSC and rooming-in encouraged./multiparous mom

## 2021-03-10 NOTE — PROGRESS NOTE ADULT - SUBJECTIVE AND OBJECTIVE BOX
Patient evaluated at bedside.   She reports pain is well controlled.  Hollingsworth in place  No Flatus  Not OOB yet.    She denies HA, dizziness, CP, palpitations, SOB, n/v, or heavy vaginal bleeding.    Physical Exam:  Vital Signs Last 24 Hrs  T(C): 36.4 (10 Mar 2021 08:42), Max: 36.7 (09 Mar 2021 22:00)  T(F): 97.5 (10 Mar 2021 08:42), Max: 98.1 (10 Mar 2021 06:00)  HR: 97 (10 Mar 2021 08:42) (77 - 106)  BP: 109/73 (10 Mar 2021 08:42) (98/59 - 120/58)  BP(mean): --  RR: 18 (10 Mar 2021 08:42) (17 - 18)  SpO2: 98% (10 Mar 2021 08:42) (96% - 98%)    Gen: NAD  Abd: + BS, soft, nontender, nondistended, no rebound or guarding  Incision clean, dry and intact  uterus firm at midline  : lochia WNL  Extremities: no swelling or calf tenderness                          8.0    9.22  )-----------( 172      ( 10 Mar 2021 06:09 )             26.2       MEDICATIONS  (STANDING):  acetaminophen   Tablet .. 975 milliGRAM(s) Oral <User Schedule>  chlorhexidine 2% Cloths 1 Application(s) Topical daily  citric acid/sodium citrate Solution 15 milliLiter(s) Oral every 6 hours  diphtheria/tetanus/pertussis (acellular) Vaccine (ADAcel) 0.5 milliLiter(s) IntraMuscular once  enoxaparin Injectable 40 milliGRAM(s) SubCutaneous daily  ferrous    sulfate 325 milliGRAM(s) Oral daily  folic acid 1 milliGRAM(s) Oral daily  ibuprofen  Tablet. 600 milliGRAM(s) Oral every 6 hours  ketorolac   Injectable 30 milliGRAM(s) IV Push every 8 hours  ketorolac   Injectable 30 milliGRAM(s) IV Push once  lactated ringers. 1000 milliLiter(s) (125 mL/Hr) IV Continuous <Continuous>  lactated ringers. 1000 milliLiter(s) (125 mL/Hr) IV Continuous <Continuous>  oxytocin Infusion 333.333 milliUNIT(s)/Min (1000 mL/Hr) IV Continuous <Continuous>  prenatal multivitamin 1 Tablet(s) Oral daily  promethazine IVPB 12.5 milliGRAM(s) IV Intermittent once    MEDICATIONS  (PRN):  diphenhydrAMINE 25 milliGRAM(s) Oral every 6 hours PRN Pruritus  lanolin Ointment 1 Application(s) Topical every 6 hours PRN Sore Nipples  magnesium hydroxide Suspension 30 milliLiter(s) Oral two times a day PRN Constipation  oxyCODONE    IR 5 milliGRAM(s) Oral every 3 hours PRN Moderate to Severe Pain (4-10)  oxyCODONE    IR 5 milliGRAM(s) Oral once PRN Moderate to Severe Pain (4-10)  simethicone 80 milliGRAM(s) Chew every 4 hours PRN Gas

## 2021-03-10 NOTE — PROGRESS NOTE ADULT - ASSESSMENT
30y Female POD#1 s/p C/S, Uncomplicated                                     PostOp HB - 8    1. Neuro/Pain:  OPM  2  CV:  VS per routine  3. Pulm: Encourage ISS & Ambulation  4. GI:  Advance as jered  5. : Hollingsworth in place, TOV  6. DVT ppx: SCDs, Lovenox 40mg daily  7. Dispo: POD #2 or #3

## 2021-03-11 ENCOUNTER — TRANSCRIPTION ENCOUNTER (OUTPATIENT)
Age: 31
End: 2021-03-11

## 2021-03-11 VITALS
HEART RATE: 96 BPM | SYSTOLIC BLOOD PRESSURE: 107 MMHG | TEMPERATURE: 98 F | RESPIRATION RATE: 18 BRPM | OXYGEN SATURATION: 97 % | DIASTOLIC BLOOD PRESSURE: 65 MMHG

## 2021-03-11 PROCEDURE — 86850 RBC ANTIBODY SCREEN: CPT

## 2021-03-11 PROCEDURE — C1889: CPT

## 2021-03-11 PROCEDURE — 88307 TISSUE EXAM BY PATHOLOGIST: CPT

## 2021-03-11 PROCEDURE — 36415 COLL VENOUS BLD VENIPUNCTURE: CPT

## 2021-03-11 PROCEDURE — 86901 BLOOD TYPING SEROLOGIC RH(D): CPT

## 2021-03-11 PROCEDURE — 59050 FETAL MONITOR W/REPORT: CPT

## 2021-03-11 PROCEDURE — 85025 COMPLETE CBC W/AUTO DIFF WBC: CPT

## 2021-03-11 PROCEDURE — 86900 BLOOD TYPING SEROLOGIC ABO: CPT

## 2021-03-11 RX ORDER — ACETAMINOPHEN 500 MG
3 TABLET ORAL
Qty: 0 | Refills: 0 | DISCHARGE
Start: 2021-03-11

## 2021-03-11 RX ORDER — OXYCODONE HYDROCHLORIDE 5 MG/1
1 TABLET ORAL
Qty: 7 | Refills: 0
Start: 2021-03-11

## 2021-03-11 RX ORDER — IBUPROFEN 200 MG
1 TABLET ORAL
Qty: 0 | Refills: 0 | DISCHARGE
Start: 2021-03-11

## 2021-03-11 RX ADMIN — OXYCODONE HYDROCHLORIDE 5 MILLIGRAM(S): 5 TABLET ORAL at 12:30

## 2021-03-11 RX ADMIN — SIMETHICONE 80 MILLIGRAM(S): 80 TABLET, CHEWABLE ORAL at 06:26

## 2021-03-11 RX ADMIN — Medication 975 MILLIGRAM(S): at 09:03

## 2021-03-11 RX ADMIN — Medication 1 TABLET(S): at 13:24

## 2021-03-11 RX ADMIN — Medication 600 MILLIGRAM(S): at 13:23

## 2021-03-11 RX ADMIN — Medication 975 MILLIGRAM(S): at 10:20

## 2021-03-11 RX ADMIN — Medication 975 MILLIGRAM(S): at 03:19

## 2021-03-11 RX ADMIN — Medication 325 MILLIGRAM(S): at 11:58

## 2021-03-11 RX ADMIN — OXYCODONE HYDROCHLORIDE 5 MILLIGRAM(S): 5 TABLET ORAL at 07:15

## 2021-03-11 RX ADMIN — Medication 1 MILLIGRAM(S): at 11:58

## 2021-03-11 RX ADMIN — MAGNESIUM HYDROXIDE 30 MILLILITER(S): 400 TABLET, CHEWABLE ORAL at 06:26

## 2021-03-11 RX ADMIN — Medication 600 MILLIGRAM(S): at 11:58

## 2021-03-11 RX ADMIN — Medication 600 MILLIGRAM(S): at 06:26

## 2021-03-11 RX ADMIN — Medication 975 MILLIGRAM(S): at 04:02

## 2021-03-11 RX ADMIN — OXYCODONE HYDROCHLORIDE 5 MILLIGRAM(S): 5 TABLET ORAL at 00:20

## 2021-03-11 RX ADMIN — OXYCODONE HYDROCHLORIDE 5 MILLIGRAM(S): 5 TABLET ORAL at 06:26

## 2021-03-11 RX ADMIN — Medication 600 MILLIGRAM(S): at 07:15

## 2021-03-11 RX ADMIN — Medication 600 MILLIGRAM(S): at 00:20

## 2021-03-11 NOTE — PROGRESS NOTE ADULT - SUBJECTIVE AND OBJECTIVE BOX
Patient evaluated at bedside.   She reports pain is well controlled with OPM.  She has been ambulating without assistance, voiding spontaneously, passing gas, tolerating regular diet and is breastfeeding.    She denies HA, dizziness, CP, palpitations, SOB, n/v, or heavy vaginal bleeding.    Physical Exam:  Vital Signs Last 24 Hrs  T(C): 36.8 (11 Mar 2021 02:00), Max: 36.8 (10 Mar 2021 18:00)  T(F): 98.2 (11 Mar 2021 02:00), Max: 98.3 (10 Mar 2021 18:00)  HR: 96 (11 Mar 2021 02:00) (93 - 97)  BP: 107/65 (11 Mar 2021 02:00) (101/69 - 109/73)  BP(mean): --  RR: 18 (11 Mar 2021 02:00) (18 - 18)  SpO2: 97% (11 Mar 2021 02:00) (97% - 98%)    Gen: NAD  Abd: + BS, soft, nontender, nondistended, no rebound or guarding  Incision clean, dry and intact  uterus firm at midline  : lochia WNL  Extremities: no swelling or calf tenderness                          8.0    9.22  )-----------( 172      ( 10 Mar 2021 06:09 )             26.2       MEDICATIONS  (STANDING):  acetaminophen   Tablet .. 975 milliGRAM(s) Oral <User Schedule>  chlorhexidine 2% Cloths 1 Application(s) Topical daily  citric acid/sodium citrate Solution 15 milliLiter(s) Oral every 6 hours  diphtheria/tetanus/pertussis (acellular) Vaccine (ADAcel) 0.5 milliLiter(s) IntraMuscular once  enoxaparin Injectable 40 milliGRAM(s) SubCutaneous daily  ferrous    sulfate 325 milliGRAM(s) Oral daily  folic acid 1 milliGRAM(s) Oral daily  ibuprofen  Tablet. 600 milliGRAM(s) Oral every 6 hours  ketorolac   Injectable 30 milliGRAM(s) IV Push once  lactated ringers. 1000 milliLiter(s) (125 mL/Hr) IV Continuous <Continuous>  lactated ringers. 1000 milliLiter(s) (125 mL/Hr) IV Continuous <Continuous>  oxytocin Infusion 333.333 milliUNIT(s)/Min (1000 mL/Hr) IV Continuous <Continuous>  prenatal multivitamin 1 Tablet(s) Oral daily  promethazine IVPB 12.5 milliGRAM(s) IV Intermittent once    MEDICATIONS  (PRN):  diphenhydrAMINE 25 milliGRAM(s) Oral every 6 hours PRN Pruritus  lanolin Ointment 1 Application(s) Topical every 6 hours PRN Sore Nipples  magnesium hydroxide Suspension 30 milliLiter(s) Oral two times a day PRN Constipation  oxyCODONE    IR 5 milliGRAM(s) Oral every 3 hours PRN Moderate to Severe Pain (4-10)  oxyCODONE    IR 5 milliGRAM(s) Oral once PRN Moderate to Severe Pain (4-10)  simethicone 80 milliGRAM(s) Chew every 4 hours PRN Gas

## 2021-03-11 NOTE — DISCHARGE NOTE OB - PATIENT PORTAL LINK FT
You can access the FollowMyHealth Patient Portal offered by [facility] by registering at the following website: [email or web address]. By joining [facility]’s FollowMyHealth portal, you will also be able to view your health information using other applications (apps) compatible with our system.

## 2021-03-11 NOTE — DISCHARGE NOTE OB - CARE PROVIDER_API CALL
Jens Coreas)  Obstetrics and Gynecology  [address] [address]  Phone: [phone]  Fax: [phone]  Follow Up Time:

## 2021-03-16 DIAGNOSIS — Z3A.39 39 WEEKS GESTATION OF PREGNANCY: ICD-10-CM

## 2021-03-16 DIAGNOSIS — O34.211 MATERNAL CARE FOR LOW TRANSVERSE SCAR FROM PREVIOUS CESAREAN DELIVERY: ICD-10-CM

## 2021-03-16 DIAGNOSIS — Z34.83 ENCOUNTER FOR SUPERVISION OF OTHER NORMAL PREGNANCY, THIRD TRIMESTER: ICD-10-CM

## 2021-03-21 LAB — SURGICAL PATHOLOGY STUDY: SIGNIFICANT CHANGE UP

## 2022-01-18 ENCOUNTER — LABORATORY RESULT (OUTPATIENT)
Age: 32
End: 2022-01-18

## 2022-01-18 ENCOUNTER — ASOB RESULT (OUTPATIENT)
Age: 32
End: 2022-01-18

## 2022-01-18 ENCOUNTER — APPOINTMENT (OUTPATIENT)
Dept: ANTEPARTUM | Facility: CLINIC | Age: 32
End: 2022-01-18
Payer: MEDICAID

## 2022-01-18 PROCEDURE — 76801 OB US < 14 WKS SINGLE FETUS: CPT

## 2022-01-18 PROCEDURE — 76813 OB US NUCHAL MEAS 1 GEST: CPT

## 2022-02-15 ENCOUNTER — APPOINTMENT (OUTPATIENT)
Dept: ANTEPARTUM | Facility: CLINIC | Age: 32
End: 2022-02-15
Payer: MEDICAID

## 2022-02-15 ENCOUNTER — ASOB RESULT (OUTPATIENT)
Age: 32
End: 2022-02-15

## 2022-02-15 PROCEDURE — 76805 OB US >/= 14 WKS SNGL FETUS: CPT

## 2022-02-15 PROCEDURE — 76817 TRANSVAGINAL US OBSTETRIC: CPT

## 2022-03-22 ENCOUNTER — APPOINTMENT (OUTPATIENT)
Dept: ANTEPARTUM | Facility: CLINIC | Age: 32
End: 2022-03-22
Payer: MEDICAID

## 2022-03-22 ENCOUNTER — ASOB RESULT (OUTPATIENT)
Age: 32
End: 2022-03-22

## 2022-03-22 PROCEDURE — 76816 OB US FOLLOW-UP PER FETUS: CPT

## 2022-05-03 ENCOUNTER — APPOINTMENT (OUTPATIENT)
Dept: ANTEPARTUM | Facility: CLINIC | Age: 32
End: 2022-05-03
Payer: MEDICAID

## 2022-05-03 ENCOUNTER — ASOB RESULT (OUTPATIENT)
Age: 32
End: 2022-05-03

## 2022-05-03 PROCEDURE — 76819 FETAL BIOPHYS PROFIL W/O NST: CPT

## 2022-05-31 ENCOUNTER — APPOINTMENT (OUTPATIENT)
Dept: ANTEPARTUM | Facility: CLINIC | Age: 32
End: 2022-05-31
Payer: MEDICAID

## 2022-05-31 ENCOUNTER — ASOB RESULT (OUTPATIENT)
Age: 32
End: 2022-05-31

## 2022-05-31 PROCEDURE — 76818 FETAL BIOPHYS PROFILE W/NST: CPT

## 2022-05-31 PROCEDURE — 76816 OB US FOLLOW-UP PER FETUS: CPT

## 2022-07-05 ENCOUNTER — APPOINTMENT (OUTPATIENT)
Dept: ANTEPARTUM | Facility: CLINIC | Age: 32
End: 2022-07-05

## 2022-07-05 ENCOUNTER — ASOB RESULT (OUTPATIENT)
Age: 32
End: 2022-07-05

## 2022-07-05 PROCEDURE — 76819 FETAL BIOPHYS PROFIL W/O NST: CPT

## 2022-07-05 PROCEDURE — 76816 OB US FOLLOW-UP PER FETUS: CPT

## 2022-07-19 ENCOUNTER — APPOINTMENT (OUTPATIENT)
Dept: ANTEPARTUM | Facility: CLINIC | Age: 32
End: 2022-07-19

## 2022-07-19 ENCOUNTER — ASOB RESULT (OUTPATIENT)
Age: 32
End: 2022-07-19

## 2022-07-19 PROCEDURE — 76818 FETAL BIOPHYS PROFILE W/NST: CPT

## 2022-07-19 PROCEDURE — 76816 OB US FOLLOW-UP PER FETUS: CPT

## 2022-07-25 ENCOUNTER — OUTPATIENT (OUTPATIENT)
Dept: OUTPATIENT SERVICES | Facility: HOSPITAL | Age: 32
LOS: 1 days | End: 2022-07-25
Payer: MEDICAID

## 2022-07-25 DIAGNOSIS — Z01.818 ENCOUNTER FOR OTHER PREPROCEDURAL EXAMINATION: ICD-10-CM

## 2022-07-25 LAB
ALBUMIN SERPL ELPH-MCNC: 3.8 G/DL — SIGNIFICANT CHANGE UP (ref 3.3–5)
ALP SERPL-CCNC: 122 U/L — HIGH (ref 40–120)
ALT FLD-CCNC: 13 U/L — SIGNIFICANT CHANGE UP (ref 10–45)
ANION GAP SERPL CALC-SCNC: 13 MMOL/L — SIGNIFICANT CHANGE UP (ref 5–17)
APPEARANCE UR: ABNORMAL
APTT BLD: 24.6 SEC — LOW (ref 27.5–35.5)
AST SERPL-CCNC: 15 U/L — SIGNIFICANT CHANGE UP (ref 10–40)
BACTERIA # UR AUTO: ABNORMAL /HPF
BILIRUB SERPL-MCNC: 0.2 MG/DL — SIGNIFICANT CHANGE UP (ref 0.2–1.2)
BILIRUB UR-MCNC: NEGATIVE — SIGNIFICANT CHANGE UP
BLD GP AB SCN SERPL QL: NEGATIVE — SIGNIFICANT CHANGE UP
BUN SERPL-MCNC: 5 MG/DL — LOW (ref 7–23)
CALCIUM SERPL-MCNC: 9.2 MG/DL — SIGNIFICANT CHANGE UP (ref 8.4–10.5)
CHLORIDE SERPL-SCNC: 104 MMOL/L — SIGNIFICANT CHANGE UP (ref 96–108)
CO2 SERPL-SCNC: 22 MMOL/L — SIGNIFICANT CHANGE UP (ref 22–31)
COLOR SPEC: YELLOW — SIGNIFICANT CHANGE UP
CREAT SERPL-MCNC: 0.54 MG/DL — SIGNIFICANT CHANGE UP (ref 0.5–1.3)
DIFF PNL FLD: NEGATIVE — SIGNIFICANT CHANGE UP
EGFR: 125 ML/MIN/1.73M2 — SIGNIFICANT CHANGE UP
EPI CELLS # UR: ABNORMAL /HPF (ref 0–5)
GLUCOSE SERPL-MCNC: 123 MG/DL — HIGH (ref 70–99)
GLUCOSE UR QL: NEGATIVE — SIGNIFICANT CHANGE UP
HCT VFR BLD CALC: 33.9 % — LOW (ref 34.5–45)
HGB BLD-MCNC: 10.6 G/DL — LOW (ref 11.5–15.5)
INR BLD: 0.96 — SIGNIFICANT CHANGE UP (ref 0.88–1.16)
KETONES UR-MCNC: NEGATIVE — SIGNIFICANT CHANGE UP
LEUKOCYTE ESTERASE UR-ACNC: ABNORMAL
MCHC RBC-ENTMCNC: 25.6 PG — LOW (ref 27–34)
MCHC RBC-ENTMCNC: 31.3 GM/DL — LOW (ref 32–36)
MCV RBC AUTO: 81.9 FL — SIGNIFICANT CHANGE UP (ref 80–100)
NITRITE UR-MCNC: NEGATIVE — SIGNIFICANT CHANGE UP
NRBC # BLD: 0 /100 WBCS — SIGNIFICANT CHANGE UP (ref 0–0)
PH UR: 6.5 — SIGNIFICANT CHANGE UP (ref 5–8)
PLATELET # BLD AUTO: 171 K/UL — SIGNIFICANT CHANGE UP (ref 150–400)
POTASSIUM SERPL-MCNC: 4 MMOL/L — SIGNIFICANT CHANGE UP (ref 3.5–5.3)
POTASSIUM SERPL-SCNC: 4 MMOL/L — SIGNIFICANT CHANGE UP (ref 3.5–5.3)
PROT SERPL-MCNC: 6.3 G/DL — SIGNIFICANT CHANGE UP (ref 6–8.3)
PROT UR-MCNC: ABNORMAL MG/DL
PROTHROM AB SERPL-ACNC: 11.4 SEC — SIGNIFICANT CHANGE UP (ref 10.5–13.4)
RBC # BLD: 4.14 M/UL — SIGNIFICANT CHANGE UP (ref 3.8–5.2)
RBC # FLD: 16.4 % — HIGH (ref 10.3–14.5)
RBC CASTS # UR COMP ASSIST: < 5 /HPF — SIGNIFICANT CHANGE UP
RH IG SCN BLD-IMP: POSITIVE — SIGNIFICANT CHANGE UP
SODIUM SERPL-SCNC: 139 MMOL/L — SIGNIFICANT CHANGE UP (ref 135–145)
SP GR SPEC: 1.02 — SIGNIFICANT CHANGE UP (ref 1–1.03)
UROBILINOGEN FLD QL: 0.2 E.U./DL — SIGNIFICANT CHANGE UP
WBC # BLD: 8.2 K/UL — SIGNIFICANT CHANGE UP (ref 3.8–10.5)
WBC # FLD AUTO: 8.2 K/UL — SIGNIFICANT CHANGE UP (ref 3.8–10.5)
WBC UR QL: < 5 /HPF — SIGNIFICANT CHANGE UP

## 2022-07-25 PROCEDURE — 80053 COMPREHEN METABOLIC PANEL: CPT

## 2022-07-25 PROCEDURE — 85730 THROMBOPLASTIN TIME PARTIAL: CPT

## 2022-07-25 PROCEDURE — 86901 BLOOD TYPING SEROLOGIC RH(D): CPT

## 2022-07-25 PROCEDURE — 86850 RBC ANTIBODY SCREEN: CPT

## 2022-07-25 PROCEDURE — 85027 COMPLETE CBC AUTOMATED: CPT

## 2022-07-25 PROCEDURE — 81001 URINALYSIS AUTO W/SCOPE: CPT

## 2022-07-25 PROCEDURE — 86900 BLOOD TYPING SEROLOGIC ABO: CPT

## 2022-07-25 PROCEDURE — 85610 PROTHROMBIN TIME: CPT

## 2022-07-26 ENCOUNTER — TRANSCRIPTION ENCOUNTER (OUTPATIENT)
Age: 32
End: 2022-07-26

## 2022-07-27 ENCOUNTER — INPATIENT (INPATIENT)
Facility: HOSPITAL | Age: 32
LOS: 1 days | Discharge: HOME | End: 2022-07-29
Attending: OBSTETRICS & GYNECOLOGY | Admitting: OBSTETRICS & GYNECOLOGY
Payer: MEDICAID

## 2022-07-27 VITALS — WEIGHT: 160.94 LBS | HEIGHT: 61 IN

## 2022-07-27 LAB
BLD GP AB SCN SERPL QL: NEGATIVE — SIGNIFICANT CHANGE UP
HCT VFR BLD CALC: 30.9 % — LOW (ref 34.5–45)
HGB BLD-MCNC: 10 G/DL — LOW (ref 11.5–15.5)
MCHC RBC-ENTMCNC: 26.4 PG — LOW (ref 27–34)
MCHC RBC-ENTMCNC: 32.4 GM/DL — SIGNIFICANT CHANGE UP (ref 32–36)
MCV RBC AUTO: 81.5 FL — SIGNIFICANT CHANGE UP (ref 80–100)
NRBC # BLD: 0 /100 WBCS — SIGNIFICANT CHANGE UP (ref 0–0)
PLATELET # BLD AUTO: 157 K/UL — SIGNIFICANT CHANGE UP (ref 150–400)
RBC # BLD: 3.79 M/UL — LOW (ref 3.8–5.2)
RBC # FLD: 16 % — HIGH (ref 10.3–14.5)
RH IG SCN BLD-IMP: POSITIVE — SIGNIFICANT CHANGE UP
WBC # BLD: 11.65 K/UL — HIGH (ref 3.8–10.5)
WBC # FLD AUTO: 11.65 K/UL — HIGH (ref 3.8–10.5)

## 2022-07-27 PROCEDURE — 88307 TISSUE EXAM BY PATHOLOGIST: CPT | Mod: 26

## 2022-07-27 RX ORDER — CITRIC ACID/SODIUM CITRATE 300-500 MG
30 SOLUTION, ORAL ORAL ONCE
Refills: 0 | Status: COMPLETED | OUTPATIENT
Start: 2022-07-27 | End: 2022-07-27

## 2022-07-27 RX ORDER — TETANUS TOXOID, REDUCED DIPHTHERIA TOXOID AND ACELLULAR PERTUSSIS VACCINE, ADSORBED 5; 2.5; 8; 8; 2.5 [IU]/.5ML; [IU]/.5ML; UG/.5ML; UG/.5ML; UG/.5ML
0.5 SUSPENSION INTRAMUSCULAR ONCE
Refills: 0 | Status: DISCONTINUED | OUTPATIENT
Start: 2022-07-27 | End: 2022-07-29

## 2022-07-27 RX ORDER — OXYTOCIN 10 UNIT/ML
333.33 VIAL (ML) INJECTION
Qty: 20 | Refills: 0 | Status: DISCONTINUED | OUTPATIENT
Start: 2022-07-27 | End: 2022-07-29

## 2022-07-27 RX ORDER — KETOROLAC TROMETHAMINE 30 MG/ML
30 SYRINGE (ML) INJECTION EVERY 6 HOURS
Refills: 0 | Status: DISCONTINUED | OUTPATIENT
Start: 2022-07-27 | End: 2022-07-28

## 2022-07-27 RX ORDER — ACETAMINOPHEN 500 MG
975 TABLET ORAL
Refills: 0 | Status: DISCONTINUED | OUTPATIENT
Start: 2022-07-27 | End: 2022-07-29

## 2022-07-27 RX ORDER — SODIUM CHLORIDE 9 MG/ML
1000 INJECTION, SOLUTION INTRAVENOUS ONCE
Refills: 0 | Status: COMPLETED | OUTPATIENT
Start: 2022-07-27 | End: 2022-07-27

## 2022-07-27 RX ORDER — NALOXONE HYDROCHLORIDE 4 MG/.1ML
0.1 SPRAY NASAL
Refills: 0 | Status: DISCONTINUED | OUTPATIENT
Start: 2022-07-27 | End: 2022-07-29

## 2022-07-27 RX ORDER — MAGNESIUM HYDROXIDE 400 MG/1
30 TABLET, CHEWABLE ORAL
Refills: 0 | Status: DISCONTINUED | OUTPATIENT
Start: 2022-07-27 | End: 2022-07-29

## 2022-07-27 RX ORDER — SIMETHICONE 80 MG/1
80 TABLET, CHEWABLE ORAL EVERY 4 HOURS
Refills: 0 | Status: DISCONTINUED | OUTPATIENT
Start: 2022-07-27 | End: 2022-07-29

## 2022-07-27 RX ORDER — BUPIVACAINE 13.3 MG/ML
20 INJECTION, SUSPENSION, LIPOSOMAL INFILTRATION ONCE
Refills: 0 | Status: COMPLETED | OUTPATIENT
Start: 2022-07-27 | End: 2022-07-27

## 2022-07-27 RX ORDER — ONDANSETRON 8 MG/1
4 TABLET, FILM COATED ORAL EVERY 6 HOURS
Refills: 0 | Status: DISCONTINUED | OUTPATIENT
Start: 2022-07-27 | End: 2022-07-29

## 2022-07-27 RX ORDER — OXYCODONE HYDROCHLORIDE 5 MG/1
5 TABLET ORAL
Refills: 0 | Status: COMPLETED | OUTPATIENT
Start: 2022-07-27 | End: 2022-08-03

## 2022-07-27 RX ORDER — DIPHENHYDRAMINE HCL 50 MG
25 CAPSULE ORAL EVERY 6 HOURS
Refills: 0 | Status: DISCONTINUED | OUTPATIENT
Start: 2022-07-27 | End: 2022-07-29

## 2022-07-27 RX ORDER — LANOLIN
1 OINTMENT (GRAM) TOPICAL EVERY 6 HOURS
Refills: 0 | Status: DISCONTINUED | OUTPATIENT
Start: 2022-07-27 | End: 2022-07-29

## 2022-07-27 RX ORDER — FAMOTIDINE 10 MG/ML
20 INJECTION INTRAVENOUS ONCE
Refills: 0 | Status: COMPLETED | OUTPATIENT
Start: 2022-07-27 | End: 2022-07-27

## 2022-07-27 RX ORDER — SODIUM CHLORIDE 9 MG/ML
1000 INJECTION, SOLUTION INTRAVENOUS
Refills: 0 | Status: DISCONTINUED | OUTPATIENT
Start: 2022-07-27 | End: 2022-07-29

## 2022-07-27 RX ORDER — OXYCODONE HYDROCHLORIDE 5 MG/1
5 TABLET ORAL ONCE
Refills: 0 | Status: DISCONTINUED | OUTPATIENT
Start: 2022-07-27 | End: 2022-07-29

## 2022-07-27 RX ORDER — IBUPROFEN 200 MG
600 TABLET ORAL EVERY 6 HOURS
Refills: 0 | Status: COMPLETED | OUTPATIENT
Start: 2022-07-27 | End: 2023-06-25

## 2022-07-27 RX ORDER — CEFAZOLIN SODIUM 1 G
2000 VIAL (EA) INJECTION ONCE
Refills: 0 | Status: COMPLETED | OUTPATIENT
Start: 2022-07-27 | End: 2022-07-27

## 2022-07-27 RX ORDER — DEXAMETHASONE 0.5 MG/5ML
4 ELIXIR ORAL EVERY 6 HOURS
Refills: 0 | Status: DISCONTINUED | OUTPATIENT
Start: 2022-07-27 | End: 2022-07-29

## 2022-07-27 RX ADMIN — Medication 30 MILLILITER(S): at 16:05

## 2022-07-27 RX ADMIN — SODIUM CHLORIDE 2000 MILLILITER(S): 9 INJECTION, SOLUTION INTRAVENOUS at 14:00

## 2022-07-27 RX ADMIN — FAMOTIDINE 20 MILLIGRAM(S): 10 INJECTION INTRAVENOUS at 15:10

## 2022-07-27 RX ADMIN — SODIUM CHLORIDE 125 MILLILITER(S): 9 INJECTION, SOLUTION INTRAVENOUS at 15:38

## 2022-07-27 RX ADMIN — SODIUM CHLORIDE 125 MILLILITER(S): 9 INJECTION, SOLUTION INTRAVENOUS at 14:39

## 2022-07-27 RX ADMIN — Medication 100 MILLIGRAM(S): at 16:05

## 2022-07-27 RX ADMIN — Medication 30 MILLIGRAM(S): at 19:16

## 2022-07-27 RX ADMIN — BUPIVACAINE 20 MILLILITER(S): 13.3 INJECTION, SUSPENSION, LIPOSOMAL INFILTRATION at 17:17

## 2022-07-27 RX ADMIN — Medication 1000 MILLIUNIT(S)/MIN: at 16:30

## 2022-07-27 NOTE — PATIENT PROFILE OB - FALL HARM RISK - UNIVERSAL INTERVENTIONS
Bed in lowest position, wheels locked, appropriate side rails in place/Call bell, personal items and telephone in reach/Instruct patient to call for assistance before getting out of bed or chair/Non-slip footwear when patient is out of bed/[place] to call system/Physically safe environment - no spills, clutter or unnecessary equipment/Purposeful Proactive Rounding/Room/bathroom lighting operational, light cord in reach

## 2022-07-27 NOTE — PATIENT PROFILE OB - NS PRO PT RIGHT SUPPORT PERSON
Hematology/Oncology Outpatient Follow Up    PATIENT NAME:Yasmeen Rahman  :1941  MRN: [id number]  PRIMARY CARE PHYSICIAN: Alex Rodriguez MD  REFERRING PHYSICIAN: Alex Rodriguez MD    Chief Complaint   Patient presents with   • Follow-up     for adenocarcinoma of the ascending colon and GIOVANNA     HISTORY OF PRESENT ILLNESS:   1.  Stage I adenocarcinoma of the ascending colon diagnosed 2019  · This is  female who claims to have been anemic on and off for a few years for which she had been prescribed iron in the past.  She had a normal CBC on 2018 but then saw Dr. Figueredo for a bladder lesion and was found to be anemic in the  of .  She was experiencing increasing shortness of air on exertion but thought this was related to her underlying lung disease.  She also had chronic diarrhea with no blood noticed in the stool.  She was started on iron supplementation and vitamin B12 supplementation.  Her stool turned black but she was not having any nausea, vomiting, diarrhea or constipation.  She was not having any abdominal pain and had not lost any weight.  She was referred to Aayush Maurer MD and underwent work-up on 2019 including a comprehensive metabolic panel that was normal.  CBC revealed WBC 6.4, hemoglobin 10.7, MCV 84.8, RDW 16.5, platelet count 232,000, CEA  0.7 (less than 2.5).  EGD and colonoscopy was performed that day revealing 5 to 10 mm polyps in the stomach body and fundus.  Granularity, friability, erythema and congestion was present in the cardia.  3.5 cm mass in the mid ascending colon was present.  Lipoma was seen in the mid ascending colon.  4 to 6 mm polyps in the distal ascending colon and sigmoid colon were seen.  Moderate diverticulosis of the sigmoid colon, grade/stage I internal hemorrhoids and 8 mm polyp in the proximal ascending colon were seen.  Pathology on the stomach polypectomy revealed fundic gland polyp negative for dysplasia,  cardia biopsy revealed body/fundic type mucosa with mild chronic gastritis, distal ascending colon polypectomy specimen had tubular adenoma, sigmoid colon polypectomy revealed hyperplastic polyp and mass in the mid ascending colon contained invasive moderately differentiated adenocarcinoma.  The patient was then referred to Dominick Cunha DO and myself and has been seen by Dr. Cunha with plans of open right colectomy after cardiac clearance.  · 8/20/19 - Patient seen in intial consultation on referral from Dr. Arnold for colon cancer.   · 8/27/19 - WBC 7.40, hemoglobin 11.8 and platelet count 241,000.  CMP with no significant abnormality.  CEA 1.11 (0-3).  Chest x-ray with cardiomegaly and no active cardiopulmonary disease.  · 9/5/2019 patient underwent open right hemicolectomy by Dominick Cunha DO with pathology revealing invasive moderately differentiated adenocarcinoma (1.8 cm) with invasion into the muscularis propria (pT2).  Margin of excision was free of tumor.  Vermiform appendix with a obliterated lumen, otherwise unremarkable.  13 benign reactive lymph nodes, negative for metastatic tumor (pN0) were excised.  · 9/24/2019 patient seen in follow-up of surgery and no adjuvant therapy recommended.  2.  Anemia diagnosed August 2019  · 8/7/2019  hemoglobin 10.7, MCV 84.8, RDW 16.5.  Patient on ferrous sulfate 325 mg p.o. Daily.  · 9/24/2019 hemoglobin 10.9.  Patient asked to increase ferrous sulfate to 325 mg p.o. twice daily.  · 10/21/2019 hemoglobin 10, MCV 89.1, creatinine 0.83 (0.57-1).    Past Medical History:   Diagnosis Date   • Anemia 2018   • Anxiety 2018   • Arthritis    • Atrial fibrillation (CMS/HCC)    • CAD (coronary artery disease) 2013   • CKD (chronic kidney disease) 2019   • Colon cancer (CMS/HCC)    • GERD (gastroesophageal reflux disease) 2009   • Gout 2009   • High cholesterol 1989   • Hypertension 1989   • Hypothyroidism 2004   • Lupus (CMS/HCC)    • PHOENIX (obstructive sleep apnea)    •  Osteopenia 2015   • Osteoporosis 2004   • PONV (postoperative nausea and vomiting)    • Rheumatoid arthritis (CMS/HCC) 2009       Past Surgical History:   Procedure Laterality Date   • CARDIAC CATHETERIZATION      no stents   • CHOLECYSTECTOMY  1990    laparoscopic    • COLON RESECTION Right 9/5/2019    Procedure: Open right hemicolectomy;  Surgeon: Dominick Cunha DO;  Location: [facility] MAIN OR;  Service: General   • COLONOSCOPY     • HEEL SPUR EXCISION Right 1994   • HYSTERECTOMY  1994    total due to heavy beeding and fibroids   • JOINT REPLACEMENT     • TONSILLECTOMY  1962   • TOTAL HIP ARTHROPLASTY Right 2010         Current Outpatient Medications:   •  acetaminophen (TYLENOL 8 HOUR ARTHRITIS PAIN) 650 MG 8 hr tablet, Take 650 mg by mouth Every 8 (Eight) Hours As Needed for Mild Pain ., Disp: , Rfl:   •  amLODIPine (NORVASC) 10 MG tablet, Daily., Disp: , Rfl:   •  aspirin 81 MG tablet, Take 81 mg by mouth Daily., Disp: , Rfl:   •  b complex vitamins tablet, B COMPLEX TABS, Disp: , Rfl:   •  bumetanide (BUMEX) 2 MG tablet, Take 2 mg by mouth Daily., Disp: , Rfl:   •  Calcium Carbonate-Vitamin D (CALCIUM 600+D) 600-200 MG-UNIT tablet, Take 1,200 mg by mouth Daily., Disp: , Rfl:   •  Cholecalciferol (VITAMIN D3) 2000 units capsule, VITAMIN D3 2000 UNIT CAPS, Disp: , Rfl:   •  CloNIDine (CATAPRES) 0.1 MG tablet, TAKE 1 TABLET TWICE A DAY, Disp: , Rfl:   •  colestipol (COLESTID) 1 g tablet, Take 2 g by mouth 2 (Two) Times a Day., Disp: , Rfl: 1  •  Cranberry 125 MG tablet, Take 500 mg by mouth., Disp: , Rfl:   •  cyanocobalamin (VITAMIN B-12) 2500 MCG tablet tablet, 1000 mcg, Disp: , Rfl:   •  denosumab (PROLIA) 60 MG/ML solution prefilled syringe syringe, PROLIA 60 MG/ML SOSY, Disp: , Rfl:   •  diphenhydrAMINE HCl (BENADRYL ALLERGY PO), Take  by mouth., Disp: , Rfl:   •  escitalopram (LEXAPRO) 10 MG tablet, Take 10 mg by mouth Daily., Disp: , Rfl:   •  ferrous sulfate 325 (65 FE) MG tablet, TAKE 1 TABLET BY MOUTh  DAILY WITH MEALS, Disp: , Rfl:   •  gabapentin (NEURONTIN) 100 MG capsule, Take 200 mg by mouth Every Morning., Disp: , Rfl:   •  gabapentin (NEURONTIN) 300 MG capsule, Take 300 mg by mouth Every Night., Disp: , Rfl:   •  Hyaluronan (ORTHOVISC) 30 MG/2ML solution prefilled syringe injection, Inject 2 mL into the appropriate joint as directed by provider., Disp: , Rfl:   •  hydroxychloroquine (PLAQUENIL) 200 MG tablet, , Disp: , Rfl:   •  levothyroxine (SYNTHROID, LEVOTHROID) 100 MCG tablet, Take 100 mcg by mouth Daily., Disp: , Rfl:   •  metoprolol tartrate (LOPRESSOR) 100 MG tablet, TAKE 1 TABLET TWICE A DAY, Disp: , Rfl:   •  omeprazole (priLOSEC) 40 MG capsule, Daily., Disp: , Rfl:   •  ondansetron ODT (ZOFRAN ODT) 8 MG disintegrating tablet, Take 1 tablet by mouth Every 8 (Eight) Hours As Needed for Nausea or Vomiting., Disp: 30 tablet, Rfl: 5  •  potassium chloride (K-DUR) 10 MEQ CR tablet, 10 mEq Daily., Disp: , Rfl:   •  pravastatin (PRAVACHOL) 40 MG tablet, Take 40 mg by mouth Daily., Disp: , Rfl:   •  valsartan (DIOVAN) 160 MG tablet, 160 mg Daily., Disp: , Rfl:   •  vitamin C (ASCORBIC ACID) 500 MG tablet, Take 500 mg by mouth Daily., Disp: , Rfl:   •  XARELTO 20 MG tablet, , Disp: , Rfl:     Allergies   Allergen Reactions   • Codeine Nausea And Vomiting   • Hydrocodone-Acetaminophen Nausea And Vomiting     Nausea,vomiting       Family History   Problem Relation Age of Onset   • Colon cancer Brother 71       Cancer-related family history includes Colon cancer (age of onset: 71) in her brother.    Social History     Tobacco Use   • Smoking status: Never Smoker   • Smokeless tobacco: Never Used   Substance Use Topics   • Alcohol use: No     Frequency: Never   • Drug use: No       I have reviewed the history of present illness, past medical history, family history, social history, lab results, all notes and other records since the patient was last seen on 9/24/19.    SUBJECTIVE: Patient is here for follow up  "of adenocarcinoma of the ascending colon and GIOVANNA. Reports to itching all over for the last one week, but does not have a rash. Stopped iron to see if that was the cause, but still itching. Has taken Benadryl and helps, but does not last.     Male and female accompanied patient today.     MELISSA Hickey present during office visit.        REVIEW OF SYSTEMS:  Review of Systems   Constitutional: Negative for chills and fever.   HENT: Negative for ear pain, mouth sores, nosebleeds and sore throat.    Eyes: Negative for photophobia and visual disturbance.   Respiratory: Negative for wheezing and stridor.    Cardiovascular: Negative for chest pain and palpitations.   Gastrointestinal: Negative for abdominal pain, diarrhea, nausea and vomiting.   Endocrine: Negative for cold intolerance and heat intolerance.   Genitourinary: Negative for dysuria and hematuria.   Musculoskeletal: Negative for joint swelling and neck stiffness.   Skin: Negative for color change and rash.        Itching all over.    Neurological: Negative for seizures and syncope.   Hematological: Negative for adenopathy.        No obvious bleeding   Psychiatric/Behavioral: Negative for agitation, confusion and hallucinations.       OBJECTIVE:    Vitals:    10/23/19 1606   BP: 143/76   Pulse: 73   Resp: 18   Temp: 97.4 °F (36.3 °C)   Weight: 127 kg (279 lb 3.2 oz)   Height: 167.6 cm (66\")   PainSc: 0-No pain       ECOG  (1) Restricted in physically strenuous activity, ambulatory and able to do work of light nature    Physical Exam   Constitutional: She is oriented to person, place, and time. No distress.   HENT:   Head: Normocephalic and atraumatic.   Dental fillings.    Eyes: Conjunctivae and EOM are normal. Right eye exhibits no discharge. Left eye exhibits no discharge. No scleral icterus.   Eyeglasses.    Neck: Normal range of motion. Neck supple. No thyromegaly present.   Cardiovascular: Normal rate, regular rhythm and normal heart sounds. Exam " reveals no gallop and no friction rub.   Pulmonary/Chest: Effort normal. No stridor. No respiratory distress. She has no wheezes.   Abdominal: Soft. Bowel sounds are normal. She exhibits no mass. There is no tenderness. There is no rebound and no guarding.   Obese. 10 cm vertical scar above and below the umbilicus.     Musculoskeletal: Normal range of motion. She exhibits no tenderness.   She is using a walker for ambulation. 1+ edema bilateral lower extremities.    Lymphadenopathy:     She has no cervical adenopathy.   Neurological: She is alert and oriented to person, place, and time. She exhibits normal muscle tone.   Skin: Skin is warm. No rash noted. She is not diaphoretic. No erythema.   Skin is pale.    Psychiatric: She has a normal mood and affect. Her behavior is normal.   Nursing note and vitals reviewed.      RECENT LABS  WBC   Date Value Ref Range Status   10/21/2019 5.10 3.40 - 10.80 10*3/mm3 Final     RBC   Date Value Ref Range Status   10/21/2019 3.38 (L) 3.77 - 5.28 10*6/mm3 Final     Hemoglobin   Date Value Ref Range Status   10/21/2019 10.0 (L) 12.0 - 15.9 g/dL Final     Hematocrit   Date Value Ref Range Status   10/21/2019 30.1 (L) 34.0 - 46.6 % Final     MCV   Date Value Ref Range Status   10/21/2019 89.1 79.0 - 97.0 fL Final     MCH   Date Value Ref Range Status   10/21/2019 29.6 26.6 - 33.0 pg Final     MCHC   Date Value Ref Range Status   10/21/2019 33.2 31.5 - 35.7 g/dL Final     RDW   Date Value Ref Range Status   10/21/2019 17.8 (H) 12.3 - 15.4 % Final     RDW-SD   Date Value Ref Range Status   10/21/2019 56.4 (H) 37.0 - 54.0 fl Final     MPV   Date Value Ref Range Status   10/21/2019 7.6 6.0 - 12.0 fL Final     Platelets   Date Value Ref Range Status   10/21/2019 228 140 - 450 10*3/mm3 Final     Neutrophil %   Date Value Ref Range Status   10/21/2019 64.5 42.7 - 76.0 % Final     Lymphocyte %   Date Value Ref Range Status   10/21/2019 18.2 (L) 19.6 - 45.3 % Final     Monocyte %   Date Value  Ref Range Status   10/21/2019 10.4 5.0 - 12.0 % Final     Eosinophil %   Date Value Ref Range Status   10/21/2019 6.6 (H) 0.3 - 6.2 % Final     Basophil %   Date Value Ref Range Status   10/21/2019 0.3 0.0 - 1.5 % Final     Neutrophils, Absolute   Date Value Ref Range Status   10/21/2019 3.30 1.70 - 7.00 10*3/mm3 Final     Lymphocytes, Absolute   Date Value Ref Range Status   10/21/2019 0.90 0.70 - 3.10 10*3/mm3 Final     Monocytes, Absolute   Date Value Ref Range Status   10/21/2019 0.50 0.10 - 0.90 10*3/mm3 Final     Eosinophils, Absolute   Date Value Ref Range Status   10/21/2019 0.30 0.00 - 0.40 10*3/mm3 Final     Basophils, Absolute   Date Value Ref Range Status   10/21/2019 0.00 0.00 - 0.20 10*3/mm3 Final     nRBC   Date Value Ref Range Status   10/21/2019 0.1 0.0 - 0.2 /100 WBC Final       Lab Results   Component Value Date    GLUCOSE 118 (H) 10/21/2019    BUN 14 10/21/2019    CREATININE 0.83 10/21/2019    EGFRIFNONA 66 10/21/2019    EGFRIFAFRI 71 03/06/2019    BCR 16.9 10/21/2019    K 3.9 10/21/2019    CO2 27.0 10/21/2019    CALCIUM 9.0 10/21/2019    ALBUMIN 2.60 (L) 09/11/2019    LABIL2 1.4 05/17/2019    AST 54 (H) 09/11/2019    ALT 52 09/11/2019         Assessment/Plan     Invasive moderately differentiated adenocarcinoma ascending colon     Iron deficiency anemia due to chronic blood loss  - Ferritin  - Folate  - Haptoglobin  - Iron Profile  - Reticulocytes  - Protein Electrophoresis, Total  - Vitamin B12  - Erythropoietin  - Copper, Serum    Liver mass    Splenic mass    Chronic atrial fibrillation    Rheumatoid arthritis of foot, unspecified laterality, unspecified rheumatoid factor presence (CMS/HCC)      ASSESSMENT:  In spite of increasing iron pills twice a day her hemoglobin had dropped down to 10, 2 days ago.  She claims to have stopped taking oral iron for a week because of itching but is back on it as she has persisted to have the itching.  She does not have a skin rash.  We will proceed with an  anemia work-up and have mentioned the possibility of checking a bone marrow aspiration on her.    PLAN:  Check iron profile, ferritin, folate, haptoglobin, retic count, Vitamin B12, SPEP, copper and erythropoietin today.   Discontinue Ferrous Sulfate.   If itching persist consult with Dermatologist.           I have reviewed lab results, imaging, vitals and medications with the patient today. Will follow up in one month.      Patient verbalized understanding and is in agreement of the above plan.    I have reviewed and agree with the above information.   Shola Rangel M.D., F.A.C.P.            same name as above

## 2022-07-28 ENCOUNTER — TRANSCRIPTION ENCOUNTER (OUTPATIENT)
Age: 32
End: 2022-07-28

## 2022-07-28 LAB
BASOPHILS # BLD AUTO: 0.02 K/UL — SIGNIFICANT CHANGE UP (ref 0–0.2)
BASOPHILS NFR BLD AUTO: 0.2 % — SIGNIFICANT CHANGE UP (ref 0–2)
COVID-19 SPIKE DOMAIN AB INTERP: POSITIVE
COVID-19 SPIKE DOMAIN ANTIBODY RESULT: 7.81 U/ML — HIGH
EOSINOPHIL # BLD AUTO: 0.01 K/UL — SIGNIFICANT CHANGE UP (ref 0–0.5)
EOSINOPHIL NFR BLD AUTO: 0.1 % — SIGNIFICANT CHANGE UP (ref 0–6)
HCT VFR BLD CALC: 27.9 % — LOW (ref 34.5–45)
HGB BLD-MCNC: 8.8 G/DL — LOW (ref 11.5–15.5)
IMM GRANULOCYTES NFR BLD AUTO: 0.3 % — SIGNIFICANT CHANGE UP (ref 0–1.5)
LYMPHOCYTES # BLD AUTO: 1.2 K/UL — SIGNIFICANT CHANGE UP (ref 1–3.3)
LYMPHOCYTES # BLD AUTO: 10.9 % — LOW (ref 13–44)
MCHC RBC-ENTMCNC: 26 PG — LOW (ref 27–34)
MCHC RBC-ENTMCNC: 31.5 GM/DL — LOW (ref 32–36)
MCV RBC AUTO: 82.3 FL — SIGNIFICANT CHANGE UP (ref 80–100)
MONOCYTES # BLD AUTO: 0.74 K/UL — SIGNIFICANT CHANGE UP (ref 0–0.9)
MONOCYTES NFR BLD AUTO: 6.7 % — SIGNIFICANT CHANGE UP (ref 2–14)
NEUTROPHILS # BLD AUTO: 9.02 K/UL — HIGH (ref 1.8–7.4)
NEUTROPHILS NFR BLD AUTO: 81.8 % — HIGH (ref 43–77)
NRBC # BLD: 0 /100 WBCS — SIGNIFICANT CHANGE UP (ref 0–0)
PLATELET # BLD AUTO: 161 K/UL — SIGNIFICANT CHANGE UP (ref 150–400)
RBC # BLD: 3.39 M/UL — LOW (ref 3.8–5.2)
RBC # FLD: 16.1 % — HIGH (ref 10.3–14.5)
SARS-COV-2 IGG+IGM SERPL QL IA: 7.81 U/ML — HIGH
SARS-COV-2 IGG+IGM SERPL QL IA: POSITIVE
T PALLIDUM AB TITR SER: NEGATIVE — SIGNIFICANT CHANGE UP
WBC # BLD: 11.02 K/UL — HIGH (ref 3.8–10.5)
WBC # FLD AUTO: 11.02 K/UL — HIGH (ref 3.8–10.5)

## 2022-07-28 RX ORDER — IRON SUCROSE 20 MG/ML
200 INJECTION, SOLUTION INTRAVENOUS EVERY 24 HOURS
Refills: 0 | Status: DISCONTINUED | OUTPATIENT
Start: 2022-07-28 | End: 2022-07-29

## 2022-07-28 RX ORDER — IBUPROFEN 200 MG
600 TABLET ORAL EVERY 6 HOURS
Refills: 0 | Status: DISCONTINUED | OUTPATIENT
Start: 2022-07-28 | End: 2022-07-29

## 2022-07-28 RX ORDER — OXYCODONE HYDROCHLORIDE 5 MG/1
5 TABLET ORAL
Refills: 0 | Status: DISCONTINUED | OUTPATIENT
Start: 2022-07-28 | End: 2022-07-29

## 2022-07-28 RX ORDER — ENOXAPARIN SODIUM 100 MG/ML
40 INJECTION SUBCUTANEOUS EVERY 24 HOURS
Refills: 0 | Status: DISCONTINUED | OUTPATIENT
Start: 2022-07-28 | End: 2022-07-29

## 2022-07-28 RX ADMIN — ENOXAPARIN SODIUM 40 MILLIGRAM(S): 100 INJECTION SUBCUTANEOUS at 06:11

## 2022-07-28 RX ADMIN — Medication 600 MILLIGRAM(S): at 17:34

## 2022-07-28 RX ADMIN — Medication 975 MILLIGRAM(S): at 09:10

## 2022-07-28 RX ADMIN — Medication 975 MILLIGRAM(S): at 03:57

## 2022-07-28 RX ADMIN — SIMETHICONE 80 MILLIGRAM(S): 80 TABLET, CHEWABLE ORAL at 17:10

## 2022-07-28 RX ADMIN — Medication 30 MILLIGRAM(S): at 06:49

## 2022-07-28 RX ADMIN — Medication 975 MILLIGRAM(S): at 03:17

## 2022-07-28 RX ADMIN — OXYCODONE HYDROCHLORIDE 5 MILLIGRAM(S): 5 TABLET ORAL at 16:50

## 2022-07-28 RX ADMIN — Medication 975 MILLIGRAM(S): at 20:13

## 2022-07-28 RX ADMIN — Medication 975 MILLIGRAM(S): at 15:35

## 2022-07-28 RX ADMIN — Medication 30 MILLIGRAM(S): at 06:10

## 2022-07-28 RX ADMIN — IRON SUCROSE 110 MILLIGRAM(S): 20 INJECTION, SOLUTION INTRAVENOUS at 11:03

## 2022-07-28 RX ADMIN — Medication 975 MILLIGRAM(S): at 08:20

## 2022-07-28 RX ADMIN — Medication 600 MILLIGRAM(S): at 17:10

## 2022-07-28 RX ADMIN — Medication 975 MILLIGRAM(S): at 15:37

## 2022-07-28 RX ADMIN — Medication 975 MILLIGRAM(S): at 20:44

## 2022-07-28 RX ADMIN — Medication 30 MILLIGRAM(S): at 12:10

## 2022-07-28 RX ADMIN — Medication 30 MILLIGRAM(S): at 12:03

## 2022-07-28 RX ADMIN — Medication 30 MILLIGRAM(S): at 01:30

## 2022-07-28 RX ADMIN — Medication 30 MILLIGRAM(S): at 00:43

## 2022-07-28 NOTE — DISCHARGE NOTE OB - NS MD DC FALL RISK RISK
For information on Fall & Injury Prevention, visit: [email or web address] OR  [email or web address] OR  https://www.cdc.gov/steadi/patient.html

## 2022-07-28 NOTE — DISCHARGE NOTE OB - PLAN OF CARE
A/P: 32y s/p  section with ebl 1000 s/p txa, hemabate x 1, methergine x 1, POD#2, stable  -  Pain: PO motrin q6hrs, tylenol q8hrs, oxycodone for severe pain PRN  -  Post-operatively labs: post-op Hgb , hemodynamically stable, no symptoms of anemia   -  GI: tolerating regular diet, passing gas  -  : s/p masters , urinating without difficulty  -  DVT prophylaxis: encouraged increased ambulation, SCDs, Lovenox  -  Dispo: POD 2

## 2022-07-28 NOTE — DISCHARGE NOTE OB - HOSPITAL COURSE
32y female s/p  section, post operative day 2, meeting all postpartum milestones. No heavy lifting. Pelvic rest until cleared. Follow-up with obstetrician in 2 weeks.

## 2022-07-28 NOTE — PROGRESS NOTE ADULT - PROVIDER SPECIALTY LIST ADULT
Spoke to caregiver they are aware of message. Meloxicam was faxed for 32 tablets and orders were also faxed to [phone] to [facility].    OB

## 2022-07-28 NOTE — PROGRESS NOTE ADULT - SUBJECTIVE AND OBJECTIVE BOX
Patient evaluated at bedside this morning, resting comfortable in bed, with no acute events overnight.  She reports pain is well controlled   She denies headache, dizziness, chest pain, palpitations, shortness of breathe, nausea, vomiting or heavy vaginal bleeding.  She has not tried ambulating since procedure, masters remains in place at this time. Tolerating clear liquids.     Physical Exam:  Vital Signs Last 24 Hrs  T(C): 36.8 (28 Jul 2022 08:29), Max: 37.2 (28 Jul 2022 06:13)  T(F): 98.2 (28 Jul 2022 08:29), Max: 98.9 (28 Jul 2022 06:13)  HR: 99 (28 Jul 2022 08:29) (86 - 122)  BP: 110/77 (28 Jul 2022 08:29) (102/58 - 127/56)  BP(mean): 79 (27 Jul 2022 19:58) (75 - 85)  RR: 17 (28 Jul 2022 08:29) (16 - 18)  SpO2: 98% (28 Jul 2022 08:29) (95% - 100%)    Parameters below as of 28 Jul 2022 06:13  Patient On (Oxygen Delivery Method): room air        GA: NAD, A+0 x 3  CV: RRR, no murmurs/rubs  Pulm: CTAB, no wheezes/rhonchi  Breasts: soft, nontender, no palpable masses  Abd: + BS, soft, nontender, nondistended, no rebound or guarding, incision clean, dry and intact, uterus firm at midline, 1  fb below umbilicus  : masters in situ, lochia WNL  Extremities: no swelling or calf tenderness, reflexes +2 bilaterally, SCD in place                            8.8    11.02 )-----------( 161      ( 28 Jul 2022 07:16 )             27.9               acetaminophen     Tablet .. 975 milliGRAM(s) Oral <User Schedule>  dexAMETHasone  Injectable 4 milliGRAM(s) IV Push every 6 hours PRN  diphenhydrAMINE 25 milliGRAM(s) Oral every 6 hours PRN  diphtheria/tetanus/pertussis (acellular) Vaccine (ADAcel) 0.5 milliLiter(s) IntraMuscular once  enoxaparin Injectable 40 milliGRAM(s) SubCutaneous every 24 hours  ibuprofen  Tablet. 600 milliGRAM(s) Oral every 6 hours  iron sucrose IVPB 200 milliGRAM(s) IV Intermittent every 24 hours  ketorolac   Injectable 30 milliGRAM(s) IV Push every 6 hours  lactated ringers. 1000 milliLiter(s) IV Continuous <Continuous>  lanolin Ointment 1 Application(s) Topical every 6 hours PRN  magnesium hydroxide Suspension 30 milliLiter(s) Oral two times a day PRN  naloxone Injectable 0.1 milliGRAM(s) IV Push every 3 minutes PRN  ondansetron Injectable 4 milliGRAM(s) IV Push every 6 hours PRN  oxyCODONE    IR 5 milliGRAM(s) Oral every 3 hours PRN  oxyCODONE    IR 5 milliGRAM(s) Oral once PRN  oxytocin Infusion 333.333 milliUNIT(s)/Min IV Continuous <Continuous>  simethicone 80 milliGRAM(s) Chew every 4 hours PRN   Patient evaluated at bedside this morning, resting comfortable in bed, with no acute events overnight.  She reports pain is well controlled   She denies headache, dizziness, chest pain, palpitations, shortness of breathe, nausea, vomiting or heavy vaginal bleeding.  She has not tried ambulating since procedure, masters remains in place at this time. Tolerating clear liquids.     Physical Exam:  Vital Signs Last 24 Hrs  T(C): 36.8 (28 Jul 2022 08:29), Max: 37.2 (28 Jul 2022 06:13)  T(F): 98.2 (28 Jul 2022 08:29), Max: 98.9 (28 Jul 2022 06:13)  HR: 99 (28 Jul 2022 08:29) (86 - 122)  BP: 110/77 (28 Jul 2022 08:29) (102/58 - 127/56)  BP(mean): 79 (27 Jul 2022 19:58) (75 - 85)  RR: 17 (28 Jul 2022 08:29) (16 - 18)  SpO2: 98% (28 Jul 2022 08:29) (95% - 100%)    Parameters below as of 28 Jul 2022 06:13  Patient On (Oxygen Delivery Method): room air        GA: NAD, A+0 x 3  CV: RRR, no murmurs/rubs  Pulm: CTAB, no wheezes/rhonchi  Breasts: soft, nontender, no palpable masses  Abd: + BS, soft, appropriately tender, moderately distended, tympanic, no rebound or guarding, incision clean, dry and intact, uterus firm at midline, 1  fb below umbilicus  : masters in situ, lochia WNL  Extremities: no swelling or calf tenderness, reflexes +2 bilaterally, SCD in place                            8.8    11.02 )-----------( 161      ( 28 Jul 2022 07:16 )             27.9               acetaminophen     Tablet .. 975 milliGRAM(s) Oral <User Schedule>  dexAMETHasone  Injectable 4 milliGRAM(s) IV Push every 6 hours PRN  diphenhydrAMINE 25 milliGRAM(s) Oral every 6 hours PRN  diphtheria/tetanus/pertussis (acellular) Vaccine (ADAcel) 0.5 milliLiter(s) IntraMuscular once  enoxaparin Injectable 40 milliGRAM(s) SubCutaneous every 24 hours  ibuprofen  Tablet. 600 milliGRAM(s) Oral every 6 hours  iron sucrose IVPB 200 milliGRAM(s) IV Intermittent every 24 hours  ketorolac   Injectable 30 milliGRAM(s) IV Push every 6 hours  lactated ringers. 1000 milliLiter(s) IV Continuous <Continuous>  lanolin Ointment 1 Application(s) Topical every 6 hours PRN  magnesium hydroxide Suspension 30 milliLiter(s) Oral two times a day PRN  naloxone Injectable 0.1 milliGRAM(s) IV Push every 3 minutes PRN  ondansetron Injectable 4 milliGRAM(s) IV Push every 6 hours PRN  oxyCODONE    IR 5 milliGRAM(s) Oral every 3 hours PRN  oxyCODONE    IR 5 milliGRAM(s) Oral once PRN  oxytocin Infusion 333.333 milliUNIT(s)/Min IV Continuous <Continuous>  simethicone 80 milliGRAM(s) Chew every 4 hours PRN

## 2022-07-28 NOTE — DISCHARGE NOTE OB - CARE PROVIDER_API CALL
Glenn Deal)  Obstetrics and Gynecology  [address] [place] [address]  Phone: [phone]  Fax: [phone]  Established Patient  Follow Up Time:

## 2022-07-28 NOTE — DISCHARGE NOTE OB - PATIENT PORTAL LINK FT
You can access the FollowMyHealth Patient Portal offered by [facility] by registering at the following website: [email or web address]. By joining [employer]’s FollowMyHealth portal, you will also be able to view your health information using other applications (apps) compatible with our system.

## 2022-07-28 NOTE — PROGRESS NOTE ADULT - ASSESSMENT
A/P   32y  s/p  section, POD #1, stable  -  Pain: PO motrin, percocets for severe pain PRN  -  Post-operatively labs: post-op Hgb 8.8 , hemodynamically stable, no symptoms of anemia   -  GI: tolerating clears, passing gas, ADAT  -  : masters in situ; DC this AM, f/u TOV  -  DVT prophylaxis: ambulation, SCDs, SQL  -  Dispo: POD 3 or 4

## 2022-07-28 NOTE — DISCHARGE NOTE OB - CARE PLAN
1 Principal Discharge DX:	Postpartum state  Assessment and plan of treatment:	A/P: 32y s/p  section with ebl 1000 s/p txa, hemabate x 1, methergine x 1, POD#2, stable  -  Pain: PO motrin q6hrs, tylenol q8hrs, oxycodone for severe pain PRN  -  Post-operatively labs: post-op Hgb , hemodynamically stable, no symptoms of anemia   -  GI: tolerating regular diet, passing gas  -  : s/p masters , urinating without difficulty  -  DVT prophylaxis: encouraged increased ambulation, SCDs, Lovenox  -  Dispo: POD 2

## 2022-07-29 VITALS — SYSTOLIC BLOOD PRESSURE: 110 MMHG | HEART RATE: 96 BPM | DIASTOLIC BLOOD PRESSURE: 65 MMHG

## 2022-07-29 RX ORDER — IBUPROFEN 200 MG
1 TABLET ORAL
Qty: 0 | Refills: 0 | DISCHARGE
Start: 2022-07-29

## 2022-07-29 RX ORDER — FERROUS SULFATE 325(65) MG
1 TABLET ORAL
Qty: 0 | Refills: 0 | DISCHARGE

## 2022-07-29 RX ORDER — ACETAMINOPHEN 500 MG
3 TABLET ORAL
Qty: 0 | Refills: 0 | DISCHARGE
Start: 2022-07-29

## 2022-07-29 RX ADMIN — Medication 600 MILLIGRAM(S): at 01:00

## 2022-07-29 RX ADMIN — Medication 600 MILLIGRAM(S): at 12:30

## 2022-07-29 RX ADMIN — Medication 600 MILLIGRAM(S): at 12:00

## 2022-07-29 RX ADMIN — OXYCODONE HYDROCHLORIDE 5 MILLIGRAM(S): 5 TABLET ORAL at 16:09

## 2022-07-29 RX ADMIN — Medication 975 MILLIGRAM(S): at 09:30

## 2022-07-29 RX ADMIN — Medication 600 MILLIGRAM(S): at 07:25

## 2022-07-29 RX ADMIN — OXYCODONE HYDROCHLORIDE 5 MILLIGRAM(S): 5 TABLET ORAL at 03:10

## 2022-07-29 RX ADMIN — Medication 975 MILLIGRAM(S): at 02:19

## 2022-07-29 RX ADMIN — ENOXAPARIN SODIUM 40 MILLIGRAM(S): 100 INJECTION SUBCUTANEOUS at 06:40

## 2022-07-29 RX ADMIN — Medication 600 MILLIGRAM(S): at 06:40

## 2022-07-29 RX ADMIN — Medication 975 MILLIGRAM(S): at 03:10

## 2022-07-29 RX ADMIN — OXYCODONE HYDROCHLORIDE 5 MILLIGRAM(S): 5 TABLET ORAL at 02:19

## 2022-07-29 RX ADMIN — OXYCODONE HYDROCHLORIDE 5 MILLIGRAM(S): 5 TABLET ORAL at 13:15

## 2022-07-29 RX ADMIN — Medication 600 MILLIGRAM(S): at 00:24

## 2022-07-29 RX ADMIN — IRON SUCROSE 110 MILLIGRAM(S): 20 INJECTION, SOLUTION INTRAVENOUS at 09:32

## 2022-07-29 RX ADMIN — OXYCODONE HYDROCHLORIDE 5 MILLIGRAM(S): 5 TABLET ORAL at 13:45

## 2022-07-29 RX ADMIN — Medication 975 MILLIGRAM(S): at 09:32

## 2022-07-29 RX ADMIN — OXYCODONE HYDROCHLORIDE 5 MILLIGRAM(S): 5 TABLET ORAL at 16:40

## 2022-07-29 NOTE — PROGRESS NOTE ADULT - ASSESSMENT
A/P: 33yo POD#2 s/p LTCS.  Patient is stable and doing well post-operatively.    - Continue regular diet.  - Increase ambulation; lovenox and SCDs  - Continue motrin, tylenol, oxycodone PRN for pain control     KL PGY4

## 2022-07-29 NOTE — PROGRESS NOTE ADULT - SUBJECTIVE AND OBJECTIVE BOX
OB Progress Note: LTCS, POD#2    S: 31yo POD#2 s/p LTCS. Pain is well controlled. She is tolerating a regular diet and passing flatus. She is voiding spontaneously, and ambulating without difficulty. Denies CP/SOB. Denies lightheadedness/dizziness. Denies N/V, heavy vaginal bleeding.     O:  Vitals:  Vital Signs Last 24 Hrs  T(C): 36.3 (29 Jul 2022 01:51), Max: 36.9 (28 Jul 2022 18:22)  T(F): 97.4 (29 Jul 2022 01:51), Max: 98.5 (28 Jul 2022 18:22)  HR: 96 (29 Jul 2022 02:00) (92 - 99)  BP: 110/65 (29 Jul 2022 02:00) (93/61 - 110/77)  BP(mean): --  RR: 18 (29 Jul 2022 01:51) (17 - 18)  SpO2: 97% (29 Jul 2022 01:51) (96% - 98%)    Parameters below as of 29 Jul 2022 01:51  Patient On (Oxygen Delivery Method): room air        MEDICATIONS  (STANDING):  acetaminophen     Tablet .. 975 milliGRAM(s) Oral <User Schedule>  diphtheria/tetanus/pertussis (acellular) Vaccine (ADAcel) 0.5 milliLiter(s) IntraMuscular once  enoxaparin Injectable 40 milliGRAM(s) SubCutaneous every 24 hours  ibuprofen  Tablet. 600 milliGRAM(s) Oral every 6 hours  iron sucrose IVPB 200 milliGRAM(s) IV Intermittent every 24 hours  lactated ringers. 1000 milliLiter(s) (125 mL/Hr) IV Continuous <Continuous>  oxytocin Infusion 333.333 milliUNIT(s)/Min (1000 mL/Hr) IV Continuous <Continuous>      MEDICATIONS  (PRN):  dexAMETHasone  Injectable 4 milliGRAM(s) IV Push every 6 hours PRN Nausea  diphenhydrAMINE 25 milliGRAM(s) Oral every 6 hours PRN Pruritus  lanolin Ointment 1 Application(s) Topical every 6 hours PRN Sore Nipples  magnesium hydroxide Suspension 30 milliLiter(s) Oral two times a day PRN Constipation  naloxone Injectable 0.1 milliGRAM(s) IV Push every 3 minutes PRN For ANY of the following changes in patient status:  A. Breaths Per Minute LESS THAN 10, B. Oxygen saturation LESS THAN 90%, C. Sedation score of 6 for Stop After: 4 Times  ondansetron Injectable 4 milliGRAM(s) IV Push every 6 hours PRN Nausea  oxyCODONE    IR 5 milliGRAM(s) Oral once PRN Moderate to Severe Pain (4-10)  oxyCODONE    IR 5 milliGRAM(s) Oral every 3 hours PRN Moderate to Severe Pain (4-10)  simethicone 80 milliGRAM(s) Chew every 4 hours PRN Gas      Labs:  Blood type: A Positive  Rubella IgG: RPR: Negative                          8.8<L>   11.02<H> >-----------< 161    ( 07-28 @ 07:16 )             27.9<L>                        10.0<L>   11.65<H> >-----------< 157    ( 07-27 @ 20:57 )             30.9<L>            PE:  General: NAD  Abdomen: Soft, appropriately tender, incision c/d/i, provena in place  Extremities: No erythema, no pitting edema

## 2022-08-04 DIAGNOSIS — R11.2 NAUSEA WITH VOMITING, UNSPECIFIED: ICD-10-CM

## 2022-08-04 DIAGNOSIS — D50.9 IRON DEFICIENCY ANEMIA, UNSPECIFIED: ICD-10-CM

## 2022-08-04 DIAGNOSIS — R00.0 TACHYCARDIA, UNSPECIFIED: ICD-10-CM

## 2022-08-04 DIAGNOSIS — O34.211 MATERNAL CARE FOR LOW TRANSVERSE SCAR FROM PREVIOUS CESAREAN DELIVERY: ICD-10-CM

## 2022-08-04 DIAGNOSIS — F41.9 ANXIETY DISORDER, UNSPECIFIED: ICD-10-CM

## 2022-08-04 DIAGNOSIS — N99.4 POSTPROCEDURAL PELVIC PERITONEAL ADHESIONS: ICD-10-CM

## 2022-08-04 DIAGNOSIS — Z87.442 PERSONAL HISTORY OF URINARY CALCULI: ICD-10-CM

## 2022-08-04 DIAGNOSIS — K21.9 GASTRO-ESOPHAGEAL REFLUX DISEASE WITHOUT ESOPHAGITIS: ICD-10-CM

## 2022-08-04 DIAGNOSIS — Z28.09 IMMUNIZATION NOT CARRIED OUT BECAUSE OF OTHER CONTRAINDICATION: ICD-10-CM

## 2022-08-04 DIAGNOSIS — O34.219 MATERNAL CARE FOR UNSPECIFIED TYPE SCAR FROM PREVIOUS CESAREAN DELIVERY: ICD-10-CM

## 2022-08-04 DIAGNOSIS — Z3A.39 39 WEEKS GESTATION OF PREGNANCY: ICD-10-CM

## 2022-08-09 LAB — SURGICAL PATHOLOGY STUDY: SIGNIFICANT CHANGE UP

## 2022-08-11 PROCEDURE — 86901 BLOOD TYPING SEROLOGIC RH(D): CPT

## 2022-08-11 PROCEDURE — 36415 COLL VENOUS BLD VENIPUNCTURE: CPT

## 2022-08-11 PROCEDURE — 86900 BLOOD TYPING SEROLOGIC ABO: CPT

## 2022-08-11 PROCEDURE — 86769 SARS-COV-2 COVID-19 ANTIBODY: CPT

## 2022-08-11 PROCEDURE — 86780 TREPONEMA PALLIDUM: CPT

## 2022-08-11 PROCEDURE — 85027 COMPLETE CBC AUTOMATED: CPT

## 2022-08-11 PROCEDURE — 88307 TISSUE EXAM BY PATHOLOGIST: CPT

## 2022-08-11 PROCEDURE — 85025 COMPLETE CBC W/AUTO DIFF WBC: CPT

## 2022-08-11 PROCEDURE — 59050 FETAL MONITOR W/REPORT: CPT

## 2022-08-11 PROCEDURE — 86850 RBC ANTIBODY SCREEN: CPT
